# Patient Record
Sex: MALE | Race: WHITE | Employment: OTHER | URBAN - METROPOLITAN AREA
[De-identification: names, ages, dates, MRNs, and addresses within clinical notes are randomized per-mention and may not be internally consistent; named-entity substitution may affect disease eponyms.]

---

## 2019-03-12 ENCOUNTER — APPOINTMENT (OUTPATIENT)
Dept: CT IMAGING | Age: 24
End: 2019-03-12
Payer: COMMERCIAL

## 2019-03-12 ENCOUNTER — HOSPITAL ENCOUNTER (EMERGENCY)
Age: 24
Discharge: HOME | End: 2019-03-12
Attending: EMERGENCY MEDICINE
Payer: COMMERCIAL

## 2019-03-12 VITALS
DIASTOLIC BLOOD PRESSURE: 86 MMHG | RESPIRATION RATE: 22 BRPM | SYSTOLIC BLOOD PRESSURE: 134 MMHG | OXYGEN SATURATION: 100 % | HEART RATE: 96 BPM | TEMPERATURE: 98.7 F

## 2019-03-12 DIAGNOSIS — S09.90XA INJURY OF HEAD, INITIAL ENCOUNTER: Primary | ICD-10-CM

## 2019-03-12 DIAGNOSIS — S01.112A LACERATION OF LEFT EYEBROW, INITIAL ENCOUNTER: ICD-10-CM

## 2019-03-12 PROCEDURE — 72125 CT NECK SPINE W/O DYE: CPT

## 2019-03-12 PROCEDURE — 70450 CT HEAD/BRAIN W/O DYE: CPT

## 2019-03-12 PROCEDURE — 99283 EMERGENCY DEPT VISIT LOW MDM: CPT

## 2019-03-12 PROCEDURE — 4500000023 HC ED LEVEL 3 PROCEDURE

## 2019-03-12 PROCEDURE — 6370000000 HC RX 637 (ALT 250 FOR IP): Performed by: PHYSICIAN ASSISTANT

## 2019-03-12 RX ORDER — FLUTICASONE PROPIONATE 50 MCG
1 SPRAY, SUSPENSION (ML) NASAL DAILY
COMMUNITY

## 2019-03-12 RX ORDER — ACETAMINOPHEN 500 MG
1000 TABLET ORAL ONCE
Status: COMPLETED | OUTPATIENT
Start: 2019-03-12 | End: 2019-03-12

## 2019-03-12 RX ORDER — LORATADINE 10 MG/1
10 TABLET ORAL DAILY
Status: ON HOLD | COMMUNITY
End: 2020-07-11

## 2019-03-12 RX ORDER — RISPERIDONE 0.5 MG/1
0.5 TABLET, FILM COATED ORAL NIGHTLY
COMMUNITY
End: 2019-05-01

## 2019-03-12 RX ORDER — ACETAMINOPHEN 500 MG
1000 TABLET ORAL 3 TIMES DAILY
Qty: 90 TABLET | Refills: 0 | Status: SHIPPED | OUTPATIENT
Start: 2019-03-12 | End: 2019-05-01

## 2019-03-12 RX ORDER — ACETAMINOPHEN 325 MG/1
650 TABLET ORAL EVERY 6 HOURS PRN
COMMUNITY

## 2019-03-12 RX ORDER — TRAZODONE HYDROCHLORIDE 100 MG/1
100 TABLET ORAL NIGHTLY
COMMUNITY

## 2019-03-12 RX ADMIN — ACETAMINOPHEN 1000 MG: 500 TABLET ORAL at 13:17

## 2019-03-12 NOTE — ED PROVIDER NOTES
file     Attends [employer] service: Not on file     Active member of club or organization: Not on file     Attends meetings of clubs or organizations: Not on file     Relationship status: Not on file    Intimate partner violence:     Fear of current or ex partner: Not on file     Emotionally abused: Not on file     Physically abused: Not on file     Forced sexual activity: Not on file   Other Topics Concern    Not on file   Social History Narrative    Not on file       SCREENINGS             PHYSICAL EXAM    (up to 7 for level 4, 8 or more for level 5)     ED Triage Vitals [03/12/19 1105]   BP Temp Temp Source Pulse Resp SpO2 Height Weight   121/75 98.7 °F (37.1 °C) Axillary 94 16 100 % -- --       Physical Exam   Constitutional: He is oriented to person, place, and time. He appears well-developed and well-nourished. HENT:   Head: Normocephalic and atraumatic. Nose: Nose normal.   Eyes: Right eye exhibits no discharge. Left eye exhibits no discharge. Neck: Normal range of motion. Neck supple. Cardiovascular: Normal rate, regular rhythm and normal heart sounds. Exam reveals no gallop and no friction rub. No murmur heard. Pulmonary/Chest: Effort normal and breath sounds normal. No respiratory distress. He has no wheezes. He has no rales. Musculoskeletal: Normal range of motion. Neurological: He is alert and oriented to person, place, and time. He displays normal reflexes. No cranial nerve deficit. He exhibits normal muscle tone. Coordination normal.   Skin: Skin is warm and dry. Laceration (3cm laceration present on left eyebrow/forehead. Well approximated without foreign body. Hemostasis achieved. ) noted. No rash noted. He is not diaphoretic. Psychiatric: He has a normal mood and affect. His behavior is normal.   Nursing note and vitals reviewed.       DIAGNOSTIC RESULTS   LABS:    Labs Reviewed - No data to display    All other labs were within normal range or not returned as of this and mastoid air cells demonstrate no acute abnormality. SOFT TISSUES/SKULL:  No acute abnormality of the visualized skull or soft tissues. No acute intracranial abnormality. Ct Cervical Spine Wo Contrast    Result Date: 3/12/2019  EXAMINATION: CT OF THE CERVICAL SPINE WITHOUT CONTRAST 3/12/2019 11:20 am TECHNIQUE: CT of the cervical spine was performed without the administration of intravenous contrast. Multiplanar reformatted images are provided for review. Dose modulation, iterative reconstruction, and/or weight based adjustment of the mA/kV was utilized to reduce the radiation dose to as low as reasonably achievable. COMPARISON: None. HISTORY: ORDERING SYSTEM PROVIDED HISTORY: head injury TECHNOLOGIST PROVIDED HISTORY: If patient is on cardiac monitor and/or pulse ox, they may be taken off cardiac monitor and pulse ox, left on O2 if currently on. All monitors reattached when patient returns to room. Ordering Physician Provided Reason for Exam: was hitting head against floor repeadetly/ laceration to left upper eyebrow Acuity: Acute Type of Exam: Initial FINDINGS: BONES/ALIGNMENT: There is no evidence of an acute cervical spine fracture. There is normal alignment of the cervical spine. DEGENERATIVE CHANGES: No significant degenerative changes. SOFT TISSUES: There is no prevertebral soft tissue swelling. No acute abnormality of the cervical spine.          PROCEDURES   Unless otherwise noted below, none     Lac Repair  Date/Time: 3/17/2019 9:37 PM  Performed by: Marita Shetty PA-C  Authorized by: Zafar Diaz MD     Consent:     Consent obtained:  Verbal    Consent given by:  Parent    Risks discussed:  Infection, need for additional repair, nerve damage, pain, poor cosmetic result, poor wound healing, retained foreign body, tendon damage and vascular damage    Alternatives discussed:  No treatment, delayed treatment, observation and referral  Anesthesia (see MAR for exact dosages): Anesthesia method:  None  Laceration details:     Location:  Face    Face location:  L eyebrow    Length (cm):  3    Depth (mm):  7  Repair type:     Repair type:  Simple  Pre-procedure details:     Preparation:  Patient was prepped and draped in usual sterile fashion and imaging obtained to evaluate for foreign bodies  Exploration:     Hemostasis achieved with:  Direct pressure    Wound exploration: wound explored through full range of motion and entire depth of wound probed and visualized      Wound extent: areolar tissue violated      Wound extent: no fascia violation noted, no foreign bodies/material noted, no muscle damage noted, no nerve damage noted, no tendon damage noted, no underlying fracture noted and no vascular damage noted      Contaminated: no    Treatment:     Area cleansed with:  Hibiclens and saline    Amount of cleaning:  Standard    Irrigation solution:  Sterile saline    Irrigation method:  Syringe    Visualized foreign bodies/material removed: no    Skin repair:     Repair method:  Tissue adhesive  Approximation:     Approximation:  Close    Vermilion border: well-aligned    Post-procedure details:     Dressing:  Open (no dressing)    Patient tolerance of procedure: Tolerated well, no immediate complications        CRITICAL CARE TIME   N/A    CONSULTS:  None      EMERGENCY DEPARTMENT COURSE and DIFFERENTIALDIAGNOSIS/MDM:   Vitals:    Vitals:    03/12/19 1105 03/12/19 1402   BP: 121/75 134/86   Pulse: 94 96   Resp: 16 22   Temp: 98.7 °F (37.1 °C)    TempSrc: Axillary    SpO2: 100% 100%       Patient was given thefollowing medications:  Medications   acetaminophen (TYLENOL) tablet 1,000 mg (1,000 mg Oral Given 3/12/19 1317)       Patient presenting with head trauma. Patient's neurological exam was non-focal and unremarkable. [place] Head CT Rule was applied and patient did not fall into the low risk category so a head CT was obtained. This showed no significant findings.   At this time, it is felt that the most likely explanation for the patient's symptoms is head injury versus concussion. I also considered SAH, SDH, Epidural Hematoma, IPH, skull fracture, migraine but this appears less likely considering the data gathered thus far. Wound to left eyebrow was closed with tissue adhesive (See note above). Patient tolerated well. Patient provided tylenol. Discussed warning signs that would prompt return. Head trauma handout was provided. Discussed in detail head injury/concussion management. Return or present to emergency department urgently if new or worsening symptoms develop. Impression:   Head Injury  Laceration left eyebrow    Plan   Tylenol for pain control. Advised patient on supportive measures for cognitive rest - avoid strenuous use of cognitive function for at least 24 hours. This means no tv, books, texting, computers, etc. Limit visitors to the house. Instructed patient/family to monitor for neurologic symptoms, severe HA, change in mental status, seizures, loss of consciousness. Instructed patient to follow up with primary provider in 2-5 days or present to emergency department should symptoms worsen or not improve. Patient verbally expressed understanding and all questions were addressed to patient's satisfaction. FINAL IMPRESSION      1. Injury of head, initial encounter    2. Laceration of left eyebrow, initial encounter          DISPOSITION/PLAN   DISPOSITION Decision To Discharge 03/12/2019 01:49:34 PM      PATIENT REFERREDTO:  [address] [facility]  [address] [place] [address]  [phone]  Go to   As needed, If symptoms worsen      DISCHARGE MEDICATIONS:  Discharge Medication List as of 3/12/2019  1:50 PM      START taking these medications    Details   !! acetaminophen (TYLENOL) 500 MG tablet Take 2 tablets by mouth 3 times daily, Disp-90 tablet, R-0Print       !! - Potential duplicate medications found. Please discuss with provider. DISCONTINUED MEDICATIONS:  Discharge Medication List as of 3/12/2019  1:50 PM                 (Please note that portions ofthis note were completed with a voice recognition program.  Efforts were made to edit the dictations but occasionally words are mis-transcribed.)    Kimi Valdovinos PA-C (electronically signed)            Sonya Duran PA-C  03/17/19 [id number]

## 2019-03-16 NOTE — ED PROVIDER NOTES
I independently performed a history and physical on [facility]. All diagnostic, treatment, and disposition decisions were made by myself in conjunction with the advanced practice provider. For further details of [facility] emergency department encounter, please see the advance practice provider's documentation. In brief, this is a 26y.o. year old male, with   Chief Complaint   Patient presents with    Head Injury     slammed head against floor 20times (from group home) no LOC, (currently has unsteady gait, bilateral ear infection, (nonverbal)     Patient has a history of developmental delay. He has a history of hitting his head on things. Today he struck his head against the floor multiple times. On exam, I find a male patient with several superficial abrasions/lacerations to his face. No bleeding. He is awake, alert, looking around the room and occasionally picking at his face. Imaging was obtained and does not show acute abnormality. He had his wounds addressed. Patient discharged back to his group home.      Susy Call MD  03/15/19 5630

## 2019-05-01 ENCOUNTER — APPOINTMENT (OUTPATIENT)
Dept: CT IMAGING | Age: 24
End: 2019-05-01
Payer: COMMERCIAL

## 2019-05-01 ENCOUNTER — HOSPITAL ENCOUNTER (EMERGENCY)
Age: 24
Discharge: HOME | End: 2019-05-01
Attending: EMERGENCY MEDICINE
Payer: COMMERCIAL

## 2019-05-01 VITALS
OXYGEN SATURATION: 100 % | HEIGHT: 70 IN | DIASTOLIC BLOOD PRESSURE: 66 MMHG | SYSTOLIC BLOOD PRESSURE: 129 MMHG | BODY MASS INDEX: 25.77 KG/M2 | HEART RATE: 97 BPM | RESPIRATION RATE: 18 BRPM | TEMPERATURE: 98 F | WEIGHT: 180 LBS

## 2019-05-01 DIAGNOSIS — S09.90XA CLOSED HEAD INJURY, INITIAL ENCOUNTER: Primary | ICD-10-CM

## 2019-05-01 PROCEDURE — 70450 CT HEAD/BRAIN W/O DYE: CPT

## 2019-05-01 PROCEDURE — 99284 EMERGENCY DEPT VISIT MOD MDM: CPT

## 2019-05-01 RX ORDER — MAGNESIUM HYDROXIDE/ALUMINUM HYDROXICE/SIMETHICONE 120; 1200; 1200 MG/30ML; MG/30ML; MG/30ML
5 SUSPENSION ORAL EVERY 6 HOURS PRN
COMMUNITY

## 2019-05-01 RX ORDER — LOPERAMIDE HYDROCHLORIDE 2 MG/1
2 TABLET ORAL 4 TIMES DAILY PRN
COMMUNITY

## 2019-05-01 RX ORDER — CALCIUM CARB/VITAMIN D3/VIT K1 500-100-40
TABLET,CHEWABLE ORAL 2 TIMES DAILY
COMMUNITY

## 2019-05-01 RX ORDER — DIAPER,BRIEF,INFANT-TODD,DISP
EACH MISCELLANEOUS 2 TIMES DAILY
COMMUNITY
End: 2019-11-14 | Stop reason: ALTCHOICE

## 2019-05-01 RX ORDER — GUAIFENESIN AND DEXTROMETHORPHAN HYDROBROMIDE 100; 10 MG/5ML; MG/5ML
5 SOLUTION ORAL EVERY 4 HOURS PRN
COMMUNITY

## 2019-05-01 RX ORDER — PSEUDOEPHEDRINE HCL 120 MG/1
120 TABLET, FILM COATED, EXTENDED RELEASE ORAL EVERY 12 HOURS
COMMUNITY

## 2019-05-01 SDOH — HEALTH STABILITY: MENTAL HEALTH: HOW OFTEN DO YOU HAVE A DRINK CONTAINING ALCOHOL?: NEVER

## 2019-05-16 NOTE — ED PROVIDER NOTES
Emergency Physician Note    Chief Complaint  Head Injury (pt arrives to room 3 via ems c/o head inj, ems states pt is autistic and hits his head on things and today hit his head on chair, caregiver states pt usually ambulates on own but since hittin ghead he is a two assist, pt with abrasions noted to forehead, caregiver denies any loc)       History of Present Illness  April Turner is a 26 y.o. male who presents to the ED for Fall. Patient has a history of autism. Patient experience to file without his helmet earlier today around 2 - 3 PM. Patient fell from standing height. Patient did hit his head on a chair. No reported a loss of consciousness. However, since then, patient has been requiring increased assistance with ambulation. No new focal deficit. No areas of pain elicited. Patient has been more drowsy since hitting his head. No new medications. Patient is otherwise well prior to onset of current symptoms    10 systems reviewed, pertinent positives per HPI otherwise noted to be negative    I have reviewed the following from the nursing documentation:      Prior to Admission medications    Medication Sig Start Date End Date Taking? Authorizing Provider   hepatitis B vaccine (ENGERIX-B) 20 MCG/ML injection Inject 20 mcg into the muscle once   Yes Historical Provider, MD   loperamide (ANTI-DIARRHEAL) 2 MG tablet Take 2 mg by mouth 4 times daily as needed for Diarrhea   Yes Historical Provider, MD   Benzocaine-Menthol (CHLORASEPTIC) 6-10 MG LOZG lozenge Take 1 lozenge by mouth every 2 hours as needed for Sore Throat   Yes Historical Provider, MD   Dextromethorphan-guaiFENesin  MG/5ML SYRP Take 5 mLs by mouth every 4 hours as needed for Cough   Yes Historical Provider, MD   hydrocortisone 1 % cream Apply topically 2 times daily Apply topically 2 times daily.    Yes Historical Provider, MD   aluminum & magnesium hydroxide-simethicone (MAALOX) 200-200-20 MG/5ML SUSP suspension Take 5 mLs by mouth every 6 hours as needed for Indigestion   Yes Historical Provider, MD   magnesium hydroxide (MILK OF MAGNESIA CONCENTRATE) 2400 MG/10ML SUSP Take 2,400 mg by mouth once as needed   Yes Historical Provider, MD   pseudoephedrine (SUDAFED 12 HR) 120 MG extended release tablet Take 120 mg by mouth every 12 hours   Yes Historical Provider, MD   tolnaftate (TINACTIN) 1 % spray Apply topically 2 times daily Apply topically 2 times daily. Yes Historical Provider, MD   traZODone (DESYREL) 100 MG tablet Take 200 mg by mouth nightly   Yes Historical Provider, MD   fluticasone (FLONASE) 50 MCG/ACT nasal spray 1 spray by Each Nare route daily   Yes Historical Provider, MD   loratadine (CLARITIN) 10 MG tablet Take 10 mg by mouth daily   Yes Historical Provider, MD   acetaminophen (TYLENOL) 325 MG tablet Take 650 mg by mouth every 6 hours as needed for Pain   Yes Historical Provider, MD   Camphor-Eucalyptus-Menthol (VICKS VAPORUB EX) Apply topically 3 times daily as needed   Yes Historical Provider, MD       Allergies as of 05/01/2019 - Review Complete 05/01/2019   Allergen Reaction Noted    Ziprasidone hcl  05/01/2019       Past Medical History:   Diagnosis Date    Ataxia     Autism     Encephalopathy         Surgical History: History reviewed. No pertinent surgical history. Family History:  History reviewed. No pertinent family history.     Social History     Socioeconomic History    Marital status: Single     Spouse name: Not on file    Number of children: Not on file    Years of education: Not on file    Highest education level: Not on file   Occupational History    Not on file   Social Needs    Financial resource strain: Not on file    Food insecurity:     Worry: Not on file     Inability: Not on file    Transportation needs:     Medical: Not on file     Non-medical: Not on file   Tobacco Use    Smoking status: Never Smoker    Smokeless tobacco: Never Used   Substance and Sexual Activity    Alcohol use: Never Frequency: Never    Drug use: Never    Sexual activity: Never   Lifestyle    Physical activity:     Days per week: Not on file     Minutes per session: Not on file    Stress: Not on file   Relationships    Social connections:     Talks on phone: Not on file     Gets together: Not on file     Attends Congregational service: Not on file     Active member of club or organization: Not on file     Attends meetings of clubs or organizations: Not on file     Relationship status: Not on file    Intimate partner violence:     Fear of current or ex partner: Not on file     Emotionally abused: Not on file     Physically abused: Not on file     Forced sexual activity: Not on file   Other Topics Concern    Not on file   Social History Narrative    Not on file       Nursing notes reviewed. ED Triage Vitals [05/01/19 1807]   Enc Vitals Group      /66      Pulse 97      Resp 18      Temp 98 °F (36.7 °C)      Temp Source Oral      SpO2 100 %      Weight 180 lb (81.6 kg)      Height 5' 10\" (1.778 m)      Head Circumference       Peak Flow       Pain Score       Pain Loc       Pain Edu? Excl. in [address]? GENERAL:  Awake, alert. Well developed, well nourished with no apparent distress. HENT:  Normocephalic, Atraumatic, moist mucous membranes. EYES:  Pupils equal round and reactive to light, Conjunctiva normal, extraocular movements normal.  NECK:  No meningeal signs, Supple. CHEST:  Regular rate and rhythm, chest wall non-tender. LUNGS:  Clear to auscultation bilaterally, no respiratory distress. ABDOMEN:  Soft, non-tender, non-distended, normal bowel sounds. No costovertebral angle tenderness to palpation. EXTREMITIES:  Normal range of motion, no edema, no tenderness, no deformity, distal pulses present. BACK:  No tenderness. SKIN: Warm, dry and intact. NEUROLOGIC: Normal mental status. Moving all extremities to command. RADIOLOGY  X-RAYS:  I have reviewed radiologic plain film image(s).   ALL

## 2019-09-09 ENCOUNTER — APPOINTMENT (OUTPATIENT)
Dept: CT IMAGING | Age: 24
End: 2019-09-09
Payer: MEDICARE

## 2019-09-09 ENCOUNTER — HOSPITAL ENCOUNTER (EMERGENCY)
Age: 24
Discharge: HOME | End: 2019-09-10
Attending: EMERGENCY MEDICINE
Payer: MEDICARE

## 2019-09-09 VITALS
SYSTOLIC BLOOD PRESSURE: 146 MMHG | HEIGHT: 68 IN | RESPIRATION RATE: 16 BRPM | HEART RATE: 100 BPM | WEIGHT: 180 LBS | DIASTOLIC BLOOD PRESSURE: 88 MMHG | BODY MASS INDEX: 27.28 KG/M2 | OXYGEN SATURATION: 100 %

## 2019-09-09 DIAGNOSIS — S02.5XXB OPEN BROKEN TOOTH WITH COMPLICATION, INITIAL ENCOUNTER: ICD-10-CM

## 2019-09-09 DIAGNOSIS — Z87.820 HX OF TRAUMATIC BRAIN INJURY: ICD-10-CM

## 2019-09-09 DIAGNOSIS — R03.0 ELEVATED BLOOD PRESSURE READING: ICD-10-CM

## 2019-09-09 DIAGNOSIS — S09.90XA INJURY OF HEAD, INITIAL ENCOUNTER: ICD-10-CM

## 2019-09-09 DIAGNOSIS — S02.40CB OPEN FRACTURE OF RIGHT SIDE OF MAXILLA, INITIAL ENCOUNTER (HCC): ICD-10-CM
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
## 2019-09-09 PROCEDURE — 70450 CT HEAD/BRAIN W/O DYE: CPT

## 2019-09-09 PROCEDURE — 72125 CT NECK SPINE W/O DYE: CPT

## 2019-09-09 PROCEDURE — 70486 CT MAXILLOFACIAL W/O DYE: CPT

## 2019-09-09 PROCEDURE — 6370000000 HC RX 637 (ALT 250 FOR IP): Performed by: EMERGENCY MEDICINE

## 2019-09-09 PROCEDURE — 99284 EMERGENCY DEPT VISIT MOD MDM: CPT

## 2019-09-09 RX ORDER — RISPERIDONE 1 MG/1
1.5 TABLET, FILM COATED ORAL DAILY
COMMUNITY

## 2019-09-09 RX ORDER — ACETAMINOPHEN 500 MG
1000 TABLET ORAL ONCE
Status: COMPLETED | OUTPATIENT
Start: 2019-09-09 | End: 2019-09-09

## 2019-09-09 RX ADMIN — ACETAMINOPHEN 1000 MG: 500 TABLET ORAL at 23:15

## 2019-09-09 ASSESSMENT — PAIN DESCRIPTION - PAIN TYPE: TYPE: ACUTE PAIN

## 2019-09-09 ASSESSMENT — PAIN DESCRIPTION - FREQUENCY: FREQUENCY: CONTINUOUS

## 2019-09-10 PROCEDURE — 6370000000 HC RX 637 (ALT 250 FOR IP): Performed by: EMERGENCY MEDICINE

## 2019-09-10 RX ORDER — CLINDAMYCIN HYDROCHLORIDE 150 MG/1
300 CAPSULE ORAL ONCE
Status: COMPLETED | OUTPATIENT
Start: 2019-09-10 | End: 2019-09-10

## 2019-09-10 RX ORDER — CLINDAMYCIN HYDROCHLORIDE 300 MG/1
300 CAPSULE ORAL 4 TIMES DAILY
Qty: 28 CAPSULE | Refills: 0 | Status: SHIPPED | OUTPATIENT
Start: 2019-09-10 | End: 2019-09-17

## 2019-09-10 RX ADMIN — CLINDAMYCIN HYDROCHLORIDE 300 MG: 150 CAPSULE ORAL at 00:57

## 2019-09-10 NOTE — ED PROVIDER NOTES
CHIEF COMPLAINT  Fall (FALL WITH MOUTH INJURY AND FELL A SECOND TIME WITHOUT HELMET ON.)      HISTORY OF PRESENT ILLNESS  Gaston Hayes is a 26 y.o. male presents to the ED with fall, this afternoon, had a mouth injury, and his helmet off briefly and fell again, at shift change the new caregiver decided he needed to come in to be evaluated, history of encephalopathy, presumably anoxic brain injury, and is now nonverbal, grunts/moans, and needs help with his activities of daily living, lives in a group home, he is a high fall risk, and has had multiple in the past, he is unable to complain of any pain,. Has not vomited but caregiver, and has tolerated p.o. though it appears that his tooth is broken    I have reviewed the following from the nursing documentation. Past Medical History:   Diagnosis Date    Ataxia     Autism     Encephalopathy      History reviewed. No pertinent surgical history. History reviewed. No pertinent family history.   Social History     Socioeconomic History    Marital status: Single     Spouse name: Not on file    Number of children: Not on file    Years of education: Not on file    Highest education level: Not on file   Occupational History    Not on file   Social Needs    Financial resource strain: Not on file    Food insecurity:     Worry: Not on file     Inability: Not on file    Transportation needs:     Medical: Not on file     Non-medical: Not on file   Tobacco Use    Smoking status: Never Smoker    Smokeless tobacco: Never Used   Substance and Sexual Activity    Alcohol use: Never     Frequency: Never    Drug use: Never    Sexual activity: Never   Lifestyle    Physical activity:     Days per week: Not on file     Minutes per session: Not on file    Stress: Not on file   Relationships    Social connections:     Talks on phone: Not on file     Gets together: Not on file     Attends [employer] service: Not on file     Active member of club or organization: Not on contrast. Dose modulation, iterative reconstruction, and/or weight based adjustment of the mA/kV was utilized to reduce the radiation dose to as low as reasonably achievable. COMPARISON: 05/01/2019 HISTORY: ORDERING SYSTEM PROVIDED HISTORY: head injury, fall, traumatic brain injury history, dental injury, nasal injury TECHNOLOGIST PROVIDED HISTORY: Reason for Exam: fell Acuity: Acute Type of Exam: Initial FINDINGS: FACIAL BONES:  There is an acute fracture of the maxilla involving the roots of the central and lateral incisors and the cuspid on the right though the lateral incisor is absent. The pterygoid plates and zygomatic arches are intact. The mandible is intact. The mandibular condyles are normally situated. The nasal bones and maxillary nasal processes are intact. ORBITS:  The globes appear intact. The extraocular muscles, optic nerve sheath complexes and lacrimal glands appear unremarkable. No retrobulbar hematoma or mass is seen. The orbital walls and rims are intact. SINUSES/MASTOIDS:  The paranasal sinuses and mastoid air cells are well aerated. No acute fracture is seen. SOFT TISSUES:  There is soft tissue swelling anterior to the maxilla. BRAIN: There is no acute intracranial hemorrhage. No area of abnormal brain attenuation is identified. Cavum septum pellucidum is again seen. There is no hydrocephalus. There is no calvarial fracture. 1. Acute nondisplaced fracture of the anterior maxilla on the right as detailed above. 2. No other fracture. 3. No acute intracranial abnormality. Ct Cervical Spine Wo Contrast    Result Date: 9/10/2019  EXAMINATION: CT OF THE CERVICAL SPINE WITHOUT CONTRAST 9/9/2019 11:08 pm TECHNIQUE: CT of the cervical spine was performed without the administration of intravenous contrast. Multiplanar reformatted images are provided for review.  Dose modulation, iterative reconstruction, and/or weight based adjustment of the mA/kV was utilized to reduce the radiation dose to as low as reasonably achievable. COMPARISON: 03/12/2019 CT HISTORY: ORDERING SYSTEM PROVIDED HISTORY: C-SPINE TRAUMA, NEXUS/CCR POSITIVE TECHNOLOGIST PROVIDED HISTORY: Reason for Exam: fell Acuity: Acute Type of Exam: Initial FINDINGS: BONES/ALIGNMENT: There is no evidence of an acute cervical spine fracture. There is normal alignment of the cervical spine. DEGENERATIVE CHANGES: No significant degenerative changes. SOFT TISSUES: There is no prevertebral soft tissue swelling. No acute abnormality of the cervical spine. Ct Maxillofacial Wo Contrast    Result Date: 9/9/2019  EXAMINATION: CT OF THE FACE WITHOUT CONTRAST; CT OF THE HEAD WITHOUT CONTRAST  9/9/2019 11:08 pm TECHNIQUE: CT of the face was performed without the administration of intravenous contrast. Multiplanar reformatted images are provided for review. Dose modulation, iterative reconstruction, and/or weight based adjustment of the mA/kV was utilized to reduce the radiation dose to as low as reasonably achievable.; CT of the head was performed without the administration of intravenous contrast. Dose modulation, iterative reconstruction, and/or weight based adjustment of the mA/kV was utilized to reduce the radiation dose to as low as reasonably achievable. COMPARISON: 05/01/2019 HISTORY: ORDERING SYSTEM PROVIDED HISTORY: head injury, fall, traumatic brain injury history, dental injury, nasal injury TECHNOLOGIST PROVIDED HISTORY: Reason for Exam: fell Acuity: Acute Type of Exam: Initial FINDINGS: FACIAL BONES:  There is an acute fracture of the maxilla involving the roots of the central and lateral incisors and the cuspid on the right though the lateral incisor is absent. The pterygoid plates and zygomatic arches are intact. The mandible is intact. The mandibular condyles are normally situated. The nasal bones and maxillary nasal processes are intact. ORBITS:  The globes appear intact.   The extraocular muscles, optic nerve sheath

## 2019-10-04 ENCOUNTER — HOSPITAL ENCOUNTER (EMERGENCY)
Age: 24
Discharge: HOME | End: 2019-10-04
Attending: EMERGENCY MEDICINE
Payer: COMMERCIAL

## 2019-10-04 ENCOUNTER — APPOINTMENT (OUTPATIENT)
Dept: CT IMAGING | Age: 24
End: 2019-10-04
Payer: COMMERCIAL

## 2019-10-04 VITALS
TEMPERATURE: 98.8 F | HEART RATE: 73 BPM | SYSTOLIC BLOOD PRESSURE: 104 MMHG | RESPIRATION RATE: 18 BRPM | BODY MASS INDEX: 27.28 KG/M2 | HEIGHT: 68 IN | WEIGHT: 180 LBS | OXYGEN SATURATION: 98 % | DIASTOLIC BLOOD PRESSURE: 57 MMHG
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
## 2019-10-04 PROCEDURE — 70450 CT HEAD/BRAIN W/O DYE: CPT

## 2019-10-04 PROCEDURE — 99284 EMERGENCY DEPT VISIT MOD MDM: CPT

## 2019-10-04 PROCEDURE — 72125 CT NECK SPINE W/O DYE: CPT

## 2019-11-05 ENCOUNTER — APPOINTMENT (OUTPATIENT)
Dept: CT IMAGING | Age: 24
End: 2019-11-05
Payer: COMMERCIAL

## 2019-11-05 ENCOUNTER — HOSPITAL ENCOUNTER (EMERGENCY)
Age: 24
Discharge: HOME | End: 2019-11-05
Payer: COMMERCIAL

## 2019-11-05 VITALS
DIASTOLIC BLOOD PRESSURE: 65 MMHG | TEMPERATURE: 98.7 F | SYSTOLIC BLOOD PRESSURE: 108 MMHG | RESPIRATION RATE: 12 BRPM | HEART RATE: 88 BPM | OXYGEN SATURATION: 98 %

## 2019-11-05 DIAGNOSIS — S01.81XA CHIN LACERATION, INITIAL ENCOUNTER: Primary | ICD-10-CM

## 2019-11-05 PROCEDURE — 70486 CT MAXILLOFACIAL W/O DYE: CPT

## 2019-11-05 PROCEDURE — 99283 EMERGENCY DEPT VISIT LOW MDM: CPT

## 2019-11-05 PROCEDURE — 72125 CT NECK SPINE W/O DYE: CPT

## 2019-11-05 PROCEDURE — 70450 CT HEAD/BRAIN W/O DYE: CPT

## 2019-11-05 PROCEDURE — 6370000000 HC RX 637 (ALT 250 FOR IP): Performed by: PHYSICIAN ASSISTANT

## 2019-11-05 PROCEDURE — 4500000023 HC ED LEVEL 3 PROCEDURE

## 2019-11-05 RX ADMIN — Medication 3 ML: at 13:22

## 2019-11-11 ENCOUNTER — APPOINTMENT (OUTPATIENT)
Dept: CT IMAGING | Age: 24
End: 2019-11-11
Payer: COMMERCIAL

## 2019-11-11 ENCOUNTER — HOSPITAL ENCOUNTER (EMERGENCY)
Age: 24
Discharge: HOME | End: 2019-11-11
Payer: COMMERCIAL

## 2019-11-11 ENCOUNTER — APPOINTMENT (OUTPATIENT)
Dept: GENERAL RADIOLOGY | Age: 24
End: 2019-11-11
Payer: COMMERCIAL

## 2019-11-11 VITALS
SYSTOLIC BLOOD PRESSURE: 106 MMHG | DIASTOLIC BLOOD PRESSURE: 68 MMHG | WEIGHT: 175 LBS | TEMPERATURE: 97.6 F | BODY MASS INDEX: 29.16 KG/M2 | HEIGHT: 65 IN | RESPIRATION RATE: 16 BRPM | OXYGEN SATURATION: 100 % | HEART RATE: 84 BPM

## 2019-11-11 DIAGNOSIS — S50.02XA CONTUSION OF LEFT ELBOW, INITIAL ENCOUNTER: ICD-10-CM

## 2019-11-11 DIAGNOSIS — S00.03XA HEMATOMA OF LEFT PARIETAL SCALP, INITIAL ENCOUNTER: Primary | ICD-10-CM

## 2019-11-11 PROCEDURE — 72125 CT NECK SPINE W/O DYE: CPT

## 2019-11-11 PROCEDURE — 99283 EMERGENCY DEPT VISIT LOW MDM: CPT

## 2019-11-11 PROCEDURE — 73070 X-RAY EXAM OF ELBOW: CPT

## 2019-11-11 PROCEDURE — 70450 CT HEAD/BRAIN W/O DYE: CPT

## 2019-11-14 ENCOUNTER — APPOINTMENT (OUTPATIENT)
Dept: GENERAL RADIOLOGY | Age: 24
End: 2019-11-14
Payer: COMMERCIAL

## 2019-11-14 ENCOUNTER — HOSPITAL ENCOUNTER (EMERGENCY)
Age: 24
Discharge: HOME | End: 2019-11-14
Payer: COMMERCIAL

## 2019-11-14 VITALS
RESPIRATION RATE: 16 BRPM | WEIGHT: 175 LBS | OXYGEN SATURATION: 99 % | HEART RATE: 98 BPM | SYSTOLIC BLOOD PRESSURE: 119 MMHG | BODY MASS INDEX: 29.16 KG/M2 | HEIGHT: 65 IN | DIASTOLIC BLOOD PRESSURE: 63 MMHG | TEMPERATURE: 98.7 F

## 2019-11-14 DIAGNOSIS — J06.9 UPPER RESPIRATORY TRACT INFECTION, UNSPECIFIED TYPE: Primary | ICD-10-CM

## 2019-11-14 LAB
A/G RATIO: 1.3 (ref 1.1–2.2)
ALBUMIN SERPL-MCNC: 4.2 G/DL (ref 3.4–5)
ALP BLD-CCNC: 119 U/L (ref 40–129)
ALT SERPL-CCNC: <5 U/L (ref 10–40)
ANION GAP SERPL CALCULATED.3IONS-SCNC: 14 MMOL/L (ref 3–16)
AST SERPL-CCNC: 11 U/L (ref 15–37)
BILIRUB SERPL-MCNC: 0.9 MG/DL (ref 0–1)
BUN BLDV-MCNC: 7 MG/DL (ref 7–20)
CALCIUM SERPL-MCNC: 9.5 MG/DL (ref 8.3–10.6)
CHLORIDE BLD-SCNC: 101 MMOL/L (ref 99–110)
CO2: 23 MMOL/L (ref 21–32)
CREAT SERPL-MCNC: 0.6 MG/DL (ref 0.9–1.3)
GFR AFRICAN AMERICAN: >60
GFR NON-AFRICAN AMERICAN: >60
GLOBULIN: 3.2 G/DL
GLUCOSE BLD-MCNC: 91 MG/DL (ref 70–99)
HCT VFR BLD CALC: 41.2 % (ref 40.5–52.5)
HEMOGLOBIN: 13.7 G/DL (ref 13.5–17.5)
MCH RBC QN AUTO: 29.1 PG (ref 26–34)
MCHC RBC AUTO-ENTMCNC: 33.2 G/DL (ref 31–36)
MCV RBC AUTO: 87.6 FL (ref 80–100)
PDW BLD-RTO: 13.5 % (ref 12.4–15.4)
PLATELET # BLD: 198 K/UL (ref 135–450)
PMV BLD AUTO: 9.4 FL (ref 5–10.5)
POTASSIUM SERPL-SCNC: 3.9 MMOL/L (ref 3.5–5.1)
RAPID INFLUENZA  B AGN: NEGATIVE
RAPID INFLUENZA A AGN: NEGATIVE
RBC # BLD: 4.7 M/UL (ref 4.2–5.9)
SODIUM BLD-SCNC: 138 MMOL/L (ref 136–145)
TOTAL PROTEIN: 7.4 G/DL (ref 6.4–8.2)
WBC # BLD: 8.3 K/UL (ref 4–11)

## 2019-11-14 PROCEDURE — 85027 COMPLETE CBC AUTOMATED: CPT

## 2019-11-14 PROCEDURE — 6370000000 HC RX 637 (ALT 250 FOR IP): Performed by: NURSE PRACTITIONER

## 2019-11-14 PROCEDURE — 80053 COMPREHEN METABOLIC PANEL: CPT

## 2019-11-14 PROCEDURE — 99284 EMERGENCY DEPT VISIT MOD MDM: CPT

## 2019-11-14 PROCEDURE — 71045 X-RAY EXAM CHEST 1 VIEW: CPT

## 2019-11-14 PROCEDURE — 2580000003 HC RX 258: Performed by: NURSE PRACTITIONER

## 2019-11-14 PROCEDURE — 94640 AIRWAY INHALATION TREATMENT: CPT

## 2019-11-14 PROCEDURE — 87804 INFLUENZA ASSAY W/OPTIC: CPT

## 2019-11-14 RX ORDER — PREDNISONE 10 MG/1
40 TABLET ORAL DAILY
Qty: 16 TABLET | Refills: 0 | Status: SHIPPED | OUTPATIENT
Start: 2019-11-14 | End: 2019-11-18

## 2019-11-14 RX ORDER — PREDNISONE 20 MG/1
40 TABLET ORAL ONCE
Status: COMPLETED | OUTPATIENT
Start: 2019-11-14 | End: 2019-11-14

## 2019-11-14 RX ORDER — ALBUTEROL SULFATE 90 UG/1
AEROSOL, METERED RESPIRATORY (INHALATION)
Qty: 1 INHALER | Refills: 1 | Status: SHIPPED | OUTPATIENT
Start: 2019-11-14

## 2019-11-14 RX ORDER — IPRATROPIUM BROMIDE AND ALBUTEROL SULFATE 2.5; .5 MG/3ML; MG/3ML
1 SOLUTION RESPIRATORY (INHALATION) ONCE
Status: COMPLETED | OUTPATIENT
Start: 2019-11-14 | End: 2019-11-14

## 2019-11-14 RX ORDER — 0.9 % SODIUM CHLORIDE 0.9 %
1000 INTRAVENOUS SOLUTION INTRAVENOUS ONCE
Status: COMPLETED | OUTPATIENT
Start: 2019-11-14 | End: 2019-11-14

## 2019-11-14 RX ORDER — IPRATROPIUM BROMIDE AND ALBUTEROL SULFATE 2.5; .5 MG/3ML; MG/3ML
1 SOLUTION RESPIRATORY (INHALATION)
Status: DISCONTINUED | OUTPATIENT
Start: 2019-11-14 | End: 2019-11-14

## 2019-11-14 RX ADMIN — SODIUM CHLORIDE 1000 ML: 9 INJECTION, SOLUTION INTRAVENOUS at 15:36

## 2019-11-14 RX ADMIN — IPRATROPIUM BROMIDE AND ALBUTEROL SULFATE 1 AMPULE: .5; 3 SOLUTION RESPIRATORY (INHALATION) at 14:21

## 2019-11-14 RX ADMIN — PREDNISONE 40 MG: 20 TABLET ORAL at 15:05

## 2019-11-18 ENCOUNTER — HOSPITAL ENCOUNTER (OUTPATIENT)
Dept: GENERAL RADIOLOGY | Age: 24
Discharge: HOME | End: 2019-11-18
Payer: COMMERCIAL

## 2019-11-18 DIAGNOSIS — R13.10 DYSPHAGIA, UNSPECIFIED TYPE: ICD-10-CM

## 2019-11-18 PROCEDURE — 74230 X-RAY XM SWLNG FUNCJ C+: CPT

## 2019-11-25 ENCOUNTER — OFFICE VISIT (OUTPATIENT)
Dept: ENT CLINIC | Age: 24
End: 2019-11-25
Payer: COMMERCIAL

## 2019-11-25 VITALS
DIASTOLIC BLOOD PRESSURE: 70 MMHG | WEIGHT: 161 LBS | SYSTOLIC BLOOD PRESSURE: 101 MMHG | HEIGHT: 67 IN | BODY MASS INDEX: 25.27 KG/M2 | HEART RATE: 89 BPM

## 2019-11-25 DIAGNOSIS — F84.0 AUTISM: ICD-10-CM

## 2019-11-25 DIAGNOSIS — K13.79 ACQUIRED VELOPHARYNGEAL INSUFFICIENCY: ICD-10-CM

## 2019-11-25 DIAGNOSIS — R13.12 OROPHARYNGEAL DYSPHAGIA: Primary | ICD-10-CM

## 2019-11-25 DIAGNOSIS — H60.91 OTITIS EXTERNA OF RIGHT EAR, UNSPECIFIED CHRONICITY, UNSPECIFIED TYPE: ICD-10-CM

## 2019-11-25 PROCEDURE — 99204 OFFICE O/P NEW MOD 45 MIN: CPT | Performed by: OTOLARYNGOLOGY

## 2019-11-25 PROCEDURE — 92511 NASOPHARYNGOSCOPY: CPT | Performed by: OTOLARYNGOLOGY

## 2019-11-25 RX ORDER — CIPROFLOXACIN AND DEXAMETHASONE 3; 1 MG/ML; MG/ML
4 SUSPENSION/ DROPS AURICULAR (OTIC) 2 TIMES DAILY
Qty: 1 BOTTLE | Refills: 0 | Status: SHIPPED | OUTPATIENT
Start: 2019-11-25 | End: 2019-11-30

## 2019-11-26 PROBLEM — F84.0 AUTISM: Status: ACTIVE | Noted: 2019-11-26

## 2019-12-02 ENCOUNTER — TELEPHONE (OUTPATIENT)
Dept: ENT CLINIC | Age: 24
End: 2019-12-02

## 2019-12-26 ENCOUNTER — TELEPHONE (OUTPATIENT)
Dept: ENT CLINIC | Age: 24
End: 2019-12-26

## 2020-03-16 ENCOUNTER — APPOINTMENT (OUTPATIENT)
Dept: CT IMAGING | Age: 25
End: 2020-03-16
Payer: MEDICARE

## 2020-03-16 ENCOUNTER — HOSPITAL ENCOUNTER (EMERGENCY)
Age: 25
Discharge: HOME | End: 2020-03-16
Payer: MEDICARE

## 2020-03-16 VITALS
RESPIRATION RATE: 16 BRPM | TEMPERATURE: 97.8 F | SYSTOLIC BLOOD PRESSURE: 106 MMHG | DIASTOLIC BLOOD PRESSURE: 51 MMHG | HEIGHT: 71 IN | OXYGEN SATURATION: 98 % | BODY MASS INDEX: 23.8 KG/M2 | WEIGHT: 170 LBS | HEART RATE: 76 BPM

## 2020-03-16 PROCEDURE — 99283 EMERGENCY DEPT VISIT LOW MDM: CPT

## 2020-03-16 PROCEDURE — 70450 CT HEAD/BRAIN W/O DYE: CPT

## 2020-03-16 RX ORDER — AMOXICILLIN AND CLAVULANATE POTASSIUM 250; 62.5 MG/5ML; MG/5ML
500 POWDER, FOR SUSPENSION ORAL 2 TIMES DAILY
Qty: 200 ML | Refills: 0 | Status: SHIPPED | OUTPATIENT
Start: 2020-03-16 | End: 2020-03-26

## 2020-03-16 ASSESSMENT — ENCOUNTER SYMPTOMS
VOMITING: 0
NAUSEA: 0

## 2020-03-16 ASSESSMENT — VISUAL ACUITY: OU: 1

## 2020-03-16 NOTE — ED PROVIDER NOTES
History reviewed. No pertinent surgical history. CURRENT MEDICATIONS       Previous Medications    ACETAMINOPHEN (TYLENOL) 325 MG TABLET    Take 650 mg by mouth every 6 hours as needed for Pain    ALBUTEROL SULFATE HFA (PROAIR HFA) 108 (90 BASE) MCG/ACT INHALER    Use 2 puffs every 4 hours while awake for  PRN cough/wheezing  Dispense with SPACER and Instruct on use. May sub Ventolin or Proventil as needed per [employer]. ALUMINUM & MAGNESIUM HYDROXIDE-SIMETHICONE (MAALOX) 200-200-20 MG/5ML SUSP SUSPENSION    Take 5 mLs by mouth every 6 hours as needed for Indigestion    BENZOCAINE-MENTHOL (CHLORASEPTIC) 6-10 MG LOZG LOZENGE    Take 1 lozenge by mouth every 2 hours as needed for Sore Throat    CAMPHOR-EUCALYPTUS-MENTHOL (VICKS VAPORUB EX)    Apply topically 3 times daily as needed    DEXTROMETHORPHAN-GUAIFENESIN  MG/5ML SYRP    Take 5 mLs by mouth every 4 hours as needed for Cough    FLUTICASONE (FLONASE) 50 MCG/ACT NASAL SPRAY    1 spray by Each Nare route daily    LOPERAMIDE (ANTI-DIARRHEAL) 2 MG TABLET    Take 2 mg by mouth 4 times daily as needed for Diarrhea    LORATADINE (CLARITIN) 10 MG TABLET    Take 10 mg by mouth daily    MAGNESIUM HYDROXIDE (MILK OF MAGNESIA CONCENTRATE) 2400 MG/10ML SUSP    Take 2,400 mg by mouth once as needed    PHENOL 1.4 % LIQD MOUTH SPRAY    Take 1 drop by mouth once    PSEUDOEPHEDRINE (SUDAFED 12 HR) 120 MG EXTENDED RELEASE TABLET    Take 120 mg by mouth every 12 hours    RISPERIDONE (RISPERDAL) 1 MG TABLET    Take 1.5 mg by mouth 3 times daily     TOLNAFTATE (TINACTIN) 1 % SPRAY    Apply topically 2 times daily Apply topically 2 times daily. TRAZODONE (DESYREL) 100 MG TABLET    Take 200 mg by mouth nightly         ALLERGIES     Ziprasidone hcl    FAMILY HISTORY     History reviewed. No pertinent family history.       SOCIAL HISTORY       Social History     Socioeconomic History    Marital status: Single     Spouse name: None    Number of children: None    grossly intact. Gaze aligned appropriately. Right eye: No discharge. Left eye: No discharge. Conjunctiva/sclera: Conjunctivae normal.      Pupils: Pupils are equal, round, and reactive to light. Pupils are equal.   Neck:      Musculoskeletal: Normal range of motion. Cardiovascular:      Rate and Rhythm: Normal rate and regular rhythm. Heart sounds: Normal heart sounds. No murmur. No friction rub. No gallop. Pulmonary:      Effort: Pulmonary effort is normal. No respiratory distress. Breath sounds: Normal breath sounds. No wheezing. Abdominal:      General: Bowel sounds are normal. There is no distension. Palpations: Abdomen is soft. Tenderness: There is no abdominal tenderness. Musculoskeletal: Normal range of motion. Cervical back: He exhibits no tenderness and no bony tenderness. Skin:     General: Skin is warm and dry. Neurological:      Mental Status: He is alert. Mental status is at baseline. Comments: Unable to perform detailed neurological exam due to baseline MR         DIAGNOSTIC RESULTS     RADIOLOGY:  Interpretation per the Radiologist below, if available at the time of this note:    CT Head WO Contrast   Final Result   1. No evidence of intracranial injury   2. Sphenoid sinusitis and right-sided otomastoiditis           Ct Head Wo Contrast    Result Date: 3/16/2020  EXAMINATION: CT OF THE HEAD WITHOUT CONTRAST  3/16/2020 9:33 am TECHNIQUE: CT of the head was performed without the administration of intravenous contrast. Dose modulation, iterative reconstruction, and/or weight based adjustment of the mA/kV was utilized to reduce the radiation dose to as low as reasonably achievable. COMPARISON: 11/11/2019 HISTORY: ORDERING SYSTEM PROVIDED HISTORY: head injury no LOC TECHNOLOGIST PROVIDED HISTORY: If patient is on cardiac monitor and/or pulse ox, they may be taken off cardiac monitor and pulse ox, left on O2 if currently on.  All monitors reattached when patient returns to room. Has a \"code stroke\" or \"stroke alert\" been called? ->No Reason for exam:->head injury no LOC Reason for Exam: hit head, red bump to forehead,  no LOC Acuity: Acute Type of Exam: Initial FINDINGS: BRAIN/VENTRICLES: No hemorrhage, edema, or mass effect. Stable appearance of the brain ORBITS: The visualized portion of the orbits demonstrate no acute abnormality. SINUSES: New mucosal thickening in the sphenoid sinuses and fluid in the right middle ear cavity and mastoid air cells. SOFT TISSUES/SKULL:  No acute abnormality of the visualized skull or soft tissues. 1. No evidence of intracranial injury 2. Sphenoid sinusitis and right-sided otomastoiditis     EMERGENCY DEPARTMENT COURSE and DIFFERENTIAL DIAGNOSIS/MDM:   Vitals:    Vitals:    03/16/20 0902   BP: (!) 106/51   Pulse: 76   Resp: 16   Temp: 97.8 °F (36.6 °C)   TempSrc: Oral   SpO2: 98%   Weight: 170 lb (77.1 kg)   Height: 5' 11\" (1.803 m)       Patient was given the following medications:  Medications - No data to display      Afebrile, stable, patient presents to the ED for evaluation. Nontoxic no acute distress. Brought in from nursing home after self-induced head injury patient is unable to provide history. Nursing home staff report patient has been acting consistent with baseline no episodes of vomiting. Pupils are equal and reactive. No evidence of skull fracture or bony instability no cervical spine tenderness, no evidence of trauma on physical exam.  Vital signs are within normal limits. CT imaging of the head is ordered to rule out a skull fracture subdural hematoma, subarachnoid hemorrhage or other traumatic head injury. CT imaging shows no acute findings with the exception of sinusitis and otitis. Patient will be started on antibiotics. Discussed findings with caregiver at bedside advised close follow-up and return for any change in symptoms.    Patients PO2 is SpO2 on room air 98% on room air  they

## 2020-04-01 ENCOUNTER — APPOINTMENT (OUTPATIENT)
Dept: CT IMAGING | Age: 25
End: 2020-04-01
Payer: MEDICARE

## 2020-04-01 ENCOUNTER — HOSPITAL ENCOUNTER (EMERGENCY)
Age: 25
Discharge: HOME | End: 2020-04-02
Attending: EMERGENCY MEDICINE
Payer: MEDICARE

## 2020-04-01 VITALS
DIASTOLIC BLOOD PRESSURE: 86 MMHG | SYSTOLIC BLOOD PRESSURE: 120 MMHG | TEMPERATURE: 98.8 F | HEART RATE: 63 BPM | OXYGEN SATURATION: 100 % | RESPIRATION RATE: 16 BRPM

## 2020-04-01 LAB
BACTERIA: ABNORMAL /HPF
BILIRUBIN URINE: NEGATIVE
BLOOD, URINE: ABNORMAL
CLARITY: CLEAR
COLOR: YELLOW
GLUCOSE URINE: NEGATIVE MG/DL
KETONES, URINE: NEGATIVE MG/DL
LEUKOCYTE ESTERASE, URINE: NEGATIVE
MICROSCOPIC EXAMINATION: YES
MUCUS: ABNORMAL /LPF
NITRITE, URINE: NEGATIVE
PH UA: 7.5 (ref 5–8)
PROTEIN UA: NEGATIVE MG/DL
RBC UA: ABNORMAL /HPF (ref 0–4)
SPECIFIC GRAVITY UA: 1.02 (ref 1–1.03)
URINE TYPE: ABNORMAL
UROBILINOGEN, URINE: 0.2 E.U./DL
WBC UA: ABNORMAL /HPF (ref 0–5)

## 2020-04-01 PROCEDURE — 99284 EMERGENCY DEPT VISIT MOD MDM: CPT

## 2020-04-01 PROCEDURE — 70450 CT HEAD/BRAIN W/O DYE: CPT

## 2020-04-01 PROCEDURE — 81001 URINALYSIS AUTO W/SCOPE: CPT

## 2020-04-01 PROCEDURE — 87086 URINE CULTURE/COLONY COUNT: CPT

## 2020-04-01 RX ORDER — HEPATITIS B VACCINE (RECOMBINANT) 20 UG/ML
20 INJECTION, SUSPENSION INTRAMUSCULAR ONCE
COMMUNITY

## 2020-04-01 ASSESSMENT — PAIN SCALES - WONG BAKER: WONGBAKER_NUMERICALRESPONSE: 2

## 2020-04-01 ASSESSMENT — PAIN DESCRIPTION - LOCATION: LOCATION: HEAD

## 2020-04-02 LAB — URINE CULTURE, ROUTINE: NORMAL

## 2020-04-02 ASSESSMENT — VISUAL ACUITY: OU: 1

## 2020-04-02 NOTE — ED NOTES
Called pt home care nurse fermin, she is waiting on hold to talk to [address] [place] [address]. PA notified.       Long Singleton RN  04/01/20 1502

## 2020-04-02 NOTE — ED NOTES
Straight cath performed on pt. Also cleaned wax out of ears as ordered. Tolerated well. Pt has call light in reach and is lying in bed.       Humaira Garcia RN  04/01/20 9297

## 2020-04-02 NOTE — ED PROVIDER NOTES
of this dictation. EKG: All EKG's are interpreted by the Emergency Department Physician who either signs orCo-signs this chart in the absence of a cardiologist.  Please see their note for interpretation of EKG. RADIOLOGY:   Non-plain film images such as CT, Ultrasound and MRI are read by the radiologist. Asim Sanchez radiographic images are visualized andpreliminarily interpreted by the  ED Provider with the below findings:        Interpretation perthe Radiologist below, if available at the time of this note:    CT Head WO Contrast   Final Result   No acute intracranial abnormality. Persistent sphenoid sinusitis and right   mastoid effusion. Ct Head Wo Contrast    Result Date: 4/1/2020  EXAMINATION: CT OF THE HEAD WITHOUT CONTRAST  4/1/2020 9:48 pm TECHNIQUE: CT of the head was performed without the administration of intravenous contrast. Dose modulation, iterative reconstruction, and/or weight based adjustment of the mA/kV was utilized to reduce the radiation dose to as low as reasonably achievable. COMPARISON: 03/16/2020 HISTORY: ORDERING SYSTEM PROVIDED HISTORY: trauma Reason for Exam: head injury FINDINGS: BRAIN/VENTRICLES: No acute intracranial hemorrhage, mass effect or midline shift. No abnormal extra-axial fluid collection. The gray-white differentiation is maintained without evidence of an acute infarct. Ventricles are stable in appearance. Persistent cavum vergae ORBITS: The visualized portion of the orbits demonstrate no acute abnormality. SINUSES: Moderate to severe right and mild left mucosal thickening of the sphenoid sinuses. Remainder of sinuses appear well aerated. Stable moderate to severe opacification of the right mastoid air cells. Left mastoid air cells are well aerated. SOFT TISSUES/SKULL:  No acute abnormality of the visualized skull or soft tissues. No acute intracranial abnormality. Persistent sphenoid sinusitis and right mastoid effusion.           PROCEDURES   Unless otherwise noted below, none     Procedures    CRITICAL CARE TIME   N/A    CONSULTS:  None      EMERGENCY DEPARTMENT COURSE and DIFFERENTIALDIAGNOSIS/MDM:   Vitals:    Vitals:    04/01/20 1943   BP: 120/86   Pulse: 63   Resp: 16   Temp: 98.8 °F (37.1 °C)   TempSrc: Axillary   SpO2: 100%       Patient was given thefollowing medications:  Medications - No data to display    Patient is a 60-year-old male who presents for evaluation of head injury. On exam he is alert, cooperative he is afebrile. There is no evidence of basilar skull fracture. Given patient's status of being a poor historian, CT head was obtained, no evidence of acute intracranial abnormality. I spoke with caregiver who notes that patient sometimes acts this way when he has an ear infection, patient's ears were irrigated, I do not see any sign of acute otitis media or externa. Urinalysis obtained, no evidence of acute infection. At this time I believe patient's reasonable candidate for discharge home. Strict return precautions advised. Based on patient's clinical history and clinical findings, and absence of peritonitis, sepsis, or toxicity I currently estimate there is low risk for: Intracranial hemorrhage, intra-abdominal injury, perforated bowel, subdural hematoma, AAA, TAA, cauda equina or central cord syndrome, compartment syndrome, epidural mass or lesion, herniated disc causing severe stenosis, tendon or NV injury, fracture or dislocation. We have discussed the symptoms which are most concerning (e.g., bloody stool, fever, changing or worsening pain, vomiting) that necessitate immediate return. FINAL IMPRESSION      1.  Closed head injury, initial encounter          DISPOSITION/PLAN   DISPOSITION Decision To Discharge 04/02/2020 01:01:05 AM      PATIENT REFERREDTO:  his PCP    Go to   for evaluation    [facility] ED  [address] [address]  [phone]  Go to   If symptoms worsen      DISCHARGE MEDICATIONS:  Discharge Medication List as of 4/2/2020  1:02 AM          DISCONTINUED MEDICATIONS:  Discharge Medication List as of 4/2/2020  1:02 AM                 (Please note that portions ofthis note were completed with a voice recognition program.  Efforts were made to edit the dictations but occasionally words are mis-transcribed.)    CAMILA Zhang (electronically signed)        David Zhang  04/02/20 0781

## 2020-04-23 ENCOUNTER — APPOINTMENT (OUTPATIENT)
Dept: GENERAL RADIOLOGY | Age: 25
End: 2020-04-23
Payer: MEDICARE

## 2020-04-23 ENCOUNTER — APPOINTMENT (OUTPATIENT)
Dept: CT IMAGING | Age: 25
End: 2020-04-23
Payer: MEDICARE

## 2020-04-23 ENCOUNTER — HOSPITAL ENCOUNTER (EMERGENCY)
Age: 25
Discharge: HOME | End: 2020-04-23
Payer: MEDICARE

## 2020-04-23 VITALS
TEMPERATURE: 96.4 F | BODY MASS INDEX: 23.8 KG/M2 | HEART RATE: 76 BPM | OXYGEN SATURATION: 100 % | SYSTOLIC BLOOD PRESSURE: 101 MMHG | DIASTOLIC BLOOD PRESSURE: 62 MMHG | RESPIRATION RATE: 16 BRPM | HEIGHT: 71 IN | WEIGHT: 170 LBS

## 2020-04-23 PROCEDURE — 6370000000 HC RX 637 (ALT 250 FOR IP): Performed by: PHYSICIAN ASSISTANT

## 2020-04-23 PROCEDURE — 99284 EMERGENCY DEPT VISIT MOD MDM: CPT

## 2020-04-23 PROCEDURE — 70486 CT MAXILLOFACIAL W/O DYE: CPT

## 2020-04-23 PROCEDURE — 71045 X-RAY EXAM CHEST 1 VIEW: CPT

## 2020-04-23 PROCEDURE — 70450 CT HEAD/BRAIN W/O DYE: CPT

## 2020-04-23 PROCEDURE — 12013 RPR F/E/E/N/L/M 2.6-5.0 CM: CPT

## 2020-04-23 PROCEDURE — 72125 CT NECK SPINE W/O DYE: CPT

## 2020-04-23 RX ADMIN — Medication 2.25 ML: at 15:38

## 2020-04-23 NOTE — ED NOTES
Bed: 13  Expected date:   Expected time:   Means of arrival:   Comments:     Festus Gonzales RN  04/23/20 1880

## 2020-04-24 ENCOUNTER — CARE COORDINATION (OUTPATIENT)
Dept: CARE COORDINATION | Age: 25
End: 2020-04-24

## 2020-04-24 NOTE — CARE COORDINATION
ED follow up call completed. ACM reached out to the group home for follow up . Patient is doing better today, less agitation. He does have his bandage on his head intake. No additional flu like symptoms. Group home stated that they are keeping him  at least 6 feet away from other residents and have a protocol since he was in hospital setting. Patient Hotline provided as well. Patient contacted regarding recent discharge and COVID-19 risk   Care Transition Nurse/ Ambulatory Care Manager contacted the patient by telephone to perform post discharge assessment. Verified name and  with patient as identifiers. Patient has following risk factors of:  CTN/ACM reviewed discharge instructions, medical action plan and red flags related to discharge diagnosis. Reviewed and educated them on any new and changed medications related to discharge diagnosis. Advised obtaining a 90-day supply of all daily and as-needed medications. Education provided regarding infection prevention, and signs and symptoms of COVID-19 and when to seek medical attention with patient who verbalized understanding. Discussed exposure protocols and quarantine from [address] you at higher risk for severe illness  and given an opportunity for questions and concerns. The patient agrees to contact the COVID-19 hotline [phone] or PCP office for questions related to their healthcare. CTN/ACM provided contact information for future reference. From CDC: Are you at higher risk for severe illness?  Wash your hands often.  Avoid close contact (6 feet, which is about two arm lengths) with people who are sick.  Put distance between yourself and other people if COVID-19 is spreading in your community.  Clean and disinfect frequently touched surfaces.  Avoid all cruise travel and non-essential air travel.    Call your healthcare professional if you have concerns about COVID-19 and your underlying condition or if you are sick. For more information on steps you can take to protect yourself, see CDC's How to [place] for follow-up call in 7-14 days based on severity of symptoms and risk factors.

## 2020-04-25 NOTE — ED PROVIDER NOTES
going on for several months. This is not a new thing for him. He denies fevers chills nausea vomiting. Denies any change in behavior per caregiver. Caregiver reports he still has an appetite and is eating and drinking appropriately. The caregiver denies any other complaints at this time. Nursing Notes were all reviewed and agreed with or any disagreements were addressed in the HPI. REVIEW OF SYSTEMS    (2-9 systems for level 4, 10 or more for level 5)     Review of Systems   Unable to perform ROS: Other       Positives and Pertinent negatives as per HPI. Except as noted above in the ROS, all other systems were reviewed and negative. PAST MEDICAL HISTORY     Past Medical History:   Diagnosis Date    Ataxia     Autism     Cerebral artery occlusion with cerebral infarction ([facility] [address]     Encephalopathy          SURGICAL HISTORY   History reviewed. No pertinent surgical history. [address]       Discharge Medication List as of 4/23/2020  7:43 PM      CONTINUE these medications which have NOT CHANGED    Details   hepatitis B vaccine (ENGERIX-B) 20 MCG/ML injection Inject 20 mcg into the muscle onceHistorical Med      Nutritional Supplements (ENSURE ACTIVE PO) Take by mouthHistorical Med      phenol 1.4 % LIQD mouth spray Take 1 drop by mouth onceHistorical Med      albuterol sulfate HFA (PROAIR HFA) 108 (90 Base) MCG/ACT inhaler Use 2 puffs every 4 hours while awake for  PRN cough/wheezing  Dispense with SPACER and Instruct on use.   May sub Ventolin or Proventil as needed per Insurance., Disp-1 Inhaler, R-1Print      risperiDONE (RISPERDAL) 1 MG tablet Take 1.5 mg by mouth 3 times daily Historical Med      loperamide (ANTI-DIARRHEAL) 2 MG tablet Take 2 mg by mouth 4 times daily as needed for DiarrheaHistorical Med      Benzocaine-Menthol (CHLORASEPTIC) 6-10 MG LOZG lozenge Take 1 lozenge by mouth every 2 hours as needed for Sore ThroatHistorical Med      Dextromethorphan-guaiFENesin  swelling, tenderness, crepitus or edema. Abdominal:      General: Abdomen is flat. Bowel sounds are normal.      Palpations: Abdomen is soft. Tenderness: There is no abdominal tenderness. There is no right CVA tenderness, left CVA tenderness, guarding or rebound. Musculoskeletal: Normal range of motion. General: No deformity. Right lower leg: No edema. Left lower leg: No edema. Skin:     General: Skin is warm and dry. Neurological:      General: No focal deficit present. Mental Status: He is alert and oriented to person, place, and time. Mental status is at baseline. GCS: GCS eye subscore is 4. GCS verbal subscore is 5. GCS motor subscore is 6. Cranial Nerves: Cranial nerves are intact. Psychiatric:         Behavior: Behavior normal. Behavior is cooperative. DIAGNOSTIC RESULTS   LABS:    Labs Reviewed - No data to display    All other labs were within normal range or not returned as of this dictation. EKG: All EKG's are interpreted by the Emergency Department Physician in the absence of a cardiologist.  Please see their note for interpretation of EKG. RADIOLOGY:   Non-plain film images such as CT, Ultrasound and MRI are read by the radiologist. Plain radiographic images are visualized and preliminarily interpreted by the ED Provider with the below findings:        Interpretation per the Radiologist below, if available at the time of this note:    XR CHEST PORTABLE   Final Result   No acute cardiopulmonary pathology. CT FACIAL BONES WO CONTRAST   Final Result   No acute traumatic injury of the facial bones. CT Cervical Spine WO Contrast   Final Result   Normal CT cervical spine. CT Head WO Contrast   Final Result   No acute intracranial abnormality.            Ct Head Wo Contrast    Result Date: 4/23/2020  EXAMINATION: CT OF THE HEAD WITHOUT CONTRAST  4/23/2020 1:04 pm TECHNIQUE: CT of the head was performed without the intact. The mandible is intact. The mandibular condyles are normally situated. The nasal bones and maxillary nasal processes are intact. ORBITS:  The globes appear intact. The extraocular muscles, optic nerve sheath complexes and lacrimal glands appear unremarkable. No retrobulbar hematoma or mass is seen. The orbital walls and rims are intact. SINUSES/MASTOIDS:  The paranasal sinuses and mastoid air cells are well aerated. No acute fracture is seen. SOFT TISSUES:  No appreciable facial soft tissue swelling is seen. No acute traumatic injury of the facial bones. Ct Cervical Spine Wo Contrast    Result Date: 4/23/2020  EXAMINATION: CT OF THE CERVICAL SPINE WITHOUT CONTRAST 4/23/2020 4:05 pm TECHNIQUE: CT of the cervical spine was performed without the administration of intravenous contrast. Multiplanar reformatted images are provided for review. Dose modulation, iterative reconstruction, and/or weight based adjustment of the mA/kV was utilized to reduce the radiation dose to as low as reasonably achievable. COMPARISON: None. HISTORY: ORDERING SYSTEM PROVIDED HISTORY: fall TECHNOLOGIST PROVIDED HISTORY: If patient is on cardiac monitor and/or pulse ox, they may be taken off cardiac monitor and pulse ox, left on O2 if currently on. All monitors reattached when patient returns to room. Reason for exam:->fall Reason for Exam: fall Acuity: Acute Type of Exam: Initial FINDINGS: BONES/ALIGNMENT: There is no acute fracture or traumatic malalignment. DEGENERATIVE CHANGES: No significant degenerative changes. SOFT TISSUES: There is no prevertebral soft tissue swelling. Normal CT cervical spine.      Xr Chest Portable    Result Date: 4/23/2020  EXAMINATION: ONE XRAY VIEW OF THE CHEST 4/23/2020 5:08 pm COMPARISON: 11/14/2019 HISTORY: ORDERING SYSTEM PROVIDED HISTORY: cough TECHNOLOGIST PROVIDED HISTORY: Reason for exam:->cough Reason for Exam: pt is coughing Acuity: Acute Type of Exam: Initial FINDINGS: Single portable frontal view of the chest is submitted for review. The cardiac silhouette is normal in size. Lung parenchyma is clear without focal airspace consolidation, sizeable pleural effusion, or pneumothorax. Trachea is midline. Visualized osseous structures and soft tissues are grossly intact. No acute cardiopulmonary pathology. PROCEDURES   Unless otherwise noted below, none     Lac Repair  Date/Time: 4/25/2020 11:11 AM  Performed by: Bryan Nguyen PA-C  Authorized by: Bryan Nguyen PA-C     Consent:     Consent obtained:  Verbal    Consent given by:  Patient    Risks discussed:  Infection, need for additional repair, pain, poor cosmetic result, poor wound healing, retained foreign body, tendon damage, vascular damage and nerve damage    Alternatives discussed:  No treatment, delayed treatment, observation and referral  Anesthesia (see MAR for exact dosages):      Anesthesia method:  Topical application    Topical anesthetic:  LET  Laceration details:     Location:  Face    Face location:  Chin    Length (cm):  3  Repair type:     Repair type:  Simple  Pre-procedure details:     Preparation:  Patient was prepped and draped in usual sterile fashion  Exploration:     Hemostasis achieved with:  Direct pressure    Wound exploration: wound explored through full range of motion and entire depth of wound probed and visualized      Wound extent: no areolar tissue violation noted, no fascia violation noted, no foreign bodies/material noted, no muscle damage noted, no nerve damage noted, no tendon damage noted, no underlying fracture noted and no vascular damage noted      Contaminated: no    Treatment:     Area cleansed with:  Saline and Hibiclens    Amount of cleaning:  Standard    Irrigation solution:  Sterile saline    Irrigation volume:  100    Irrigation method:  Pressure wash    Visualized foreign bodies/material removed: no    Skin repair:     Repair method:  Sutures    Suture size:  4-0 to discharge. I did tell the caregiver to just clean with soap and water and keep the laceration clean and dry at home. Patient was told return immediately to the ER if he experience any new or worsening symptoms and was educated on when to return. The caregiver verbalized understanding and I did feel comfortable sending this patient home with close follow-up instructions and strict return precautions. Patient stable at time of discharge. FINAL IMPRESSION      1. Injury of head, initial encounter    2.  Chin laceration, initial encounter          DISPOSITION/PLAN   DISPOSITION Decision To Discharge 04/23/2020 06:04:02 PM      PATIENT [facility]TO:  [place] [facility]  [address] [facility]  [phone]  Schedule an appointment as soon as possible for a visit   As needed, If symptoms worsen      DISCHARGE MEDICATIONS:  Discharge Medication List as of 4/23/2020  7:43 PM          DISCONTINUED MEDICATIONS:  Discharge Medication List as of 4/23/2020  7:43 PM                 (Please note that portions of this note were completed with a voice recognition program.  Efforts were made to edit the dictations but occasionally words are mis-transcribed.)    Jesu Yoder PA-C (electronically signed)            Jesu Yoder PA-C  04/25/20 1112

## 2020-07-09 ENCOUNTER — HOSPITAL ENCOUNTER (OUTPATIENT)
Dept: GENERAL RADIOLOGY | Age: 25
Discharge: HOME | End: 2020-07-09
Payer: MEDICARE

## 2020-07-09 PROCEDURE — 74230 X-RAY XM SWLNG FUNCJ C+: CPT

## 2020-07-11 ENCOUNTER — HOSPITAL ENCOUNTER (OUTPATIENT)
Age: 25
Setting detail: OBSERVATION
Discharge: NURSING, REHAB OR LONG-TERM CARE | End: 2020-07-16
Attending: EMERGENCY MEDICINE | Admitting: INTERNAL MEDICINE
Payer: MEDICARE

## 2020-07-11 PROBLEM — R13.10 DYSPHAGIA: Status: ACTIVE | Noted: 2020-07-11

## 2020-07-11 LAB
A/G RATIO: 2 (ref 1.1–2.2)
ALBUMIN SERPL-MCNC: 4.7 G/DL (ref 3.4–5)
ALP BLD-CCNC: 67 U/L (ref 40–129)
ALT SERPL-CCNC: 15 U/L (ref 10–40)
ANION GAP SERPL CALCULATED.3IONS-SCNC: 13 MMOL/L (ref 3–16)
AST SERPL-CCNC: 13 U/L (ref 15–37)
BASOPHILS ABSOLUTE: 0.1 K/UL (ref 0–0.2)
BASOPHILS RELATIVE PERCENT: 0.9 %
BILIRUB SERPL-MCNC: 0.5 MG/DL (ref 0–1)
BILIRUBIN URINE: NEGATIVE
BLOOD, URINE: NEGATIVE
BUN BLDV-MCNC: 10 MG/DL (ref 7–20)
CALCIUM SERPL-MCNC: 9.8 MG/DL (ref 8.3–10.6)
CHLORIDE BLD-SCNC: 101 MMOL/L (ref 99–110)
CLARITY: ABNORMAL
CO2: 25 MMOL/L (ref 21–32)
COLOR: YELLOW
CREAT SERPL-MCNC: 0.6 MG/DL (ref 0.9–1.3)
EOSINOPHILS ABSOLUTE: 0 K/UL (ref 0–0.6)
EOSINOPHILS RELATIVE PERCENT: 0.7 %
GFR AFRICAN AMERICAN: >60
GFR NON-AFRICAN AMERICAN: >60
GLOBULIN: 2.4 G/DL
GLUCOSE BLD-MCNC: 95 MG/DL (ref 70–99)
GLUCOSE URINE: NEGATIVE MG/DL
HCT VFR BLD CALC: 40.8 % (ref 40.5–52.5)
HEMOGLOBIN: 14 G/DL (ref 13.5–17.5)
KETONES, URINE: NEGATIVE MG/DL
LEUKOCYTE ESTERASE, URINE: NEGATIVE
LYMPHOCYTES ABSOLUTE: 1.5 K/UL (ref 1–5.1)
LYMPHOCYTES RELATIVE PERCENT: 24.6 %
MCH RBC QN AUTO: 29.7 PG (ref 26–34)
MCHC RBC AUTO-ENTMCNC: 34.3 G/DL (ref 31–36)
MCV RBC AUTO: 86.7 FL (ref 80–100)
MICROSCOPIC EXAMINATION: ABNORMAL
MONOCYTES ABSOLUTE: 0.6 K/UL (ref 0–1.3)
MONOCYTES RELATIVE PERCENT: 8.9 %
NEUTROPHILS ABSOLUTE: 4 K/UL (ref 1.7–7.7)
NEUTROPHILS RELATIVE PERCENT: 64.9 %
NITRITE, URINE: NEGATIVE
PDW BLD-RTO: 13.9 % (ref 12.4–15.4)
PH UA: 7 (ref 5–8)
PLATELET # BLD: 163 K/UL (ref 135–450)
PMV BLD AUTO: 9.8 FL (ref 5–10.5)
POTASSIUM REFLEX MAGNESIUM: 4.1 MMOL/L (ref 3.5–5.1)
PROTEIN UA: NEGATIVE MG/DL
RBC # BLD: 4.7 M/UL (ref 4.2–5.9)
SODIUM BLD-SCNC: 139 MMOL/L (ref 136–145)
SPECIFIC GRAVITY UA: 1.02 (ref 1–1.03)
TOTAL PROTEIN: 7.1 G/DL (ref 6.4–8.2)
URINE REFLEX TO CULTURE: ABNORMAL
URINE TYPE: ABNORMAL
UROBILINOGEN, URINE: 2 E.U./DL
WBC # BLD: 6.2 K/UL (ref 4–11)

## 2020-07-11 PROCEDURE — 80053 COMPREHEN METABOLIC PANEL: CPT

## 2020-07-11 PROCEDURE — 81003 URINALYSIS AUTO W/O SCOPE: CPT

## 2020-07-11 PROCEDURE — 99284 EMERGENCY DEPT VISIT MOD MDM: CPT

## 2020-07-11 PROCEDURE — 2580000003 HC RX 258: Performed by: INTERNAL MEDICINE

## 2020-07-11 PROCEDURE — 6360000002 HC RX W HCPCS: Performed by: INTERNAL MEDICINE

## 2020-07-11 PROCEDURE — 96372 THER/PROPH/DIAG INJ SC/IM: CPT

## 2020-07-11 PROCEDURE — 85025 COMPLETE CBC W/AUTO DIFF WBC: CPT

## 2020-07-11 PROCEDURE — G0378 HOSPITAL OBSERVATION PER HR: HCPCS

## 2020-07-11 PROCEDURE — 1200000000 HC SEMI PRIVATE

## 2020-07-11 PROCEDURE — 2580000003 HC RX 258: Performed by: PHYSICIAN ASSISTANT

## 2020-07-11 RX ORDER — ONDANSETRON 2 MG/ML
4 INJECTION INTRAMUSCULAR; INTRAVENOUS EVERY 6 HOURS PRN
Status: DISCONTINUED | OUTPATIENT
Start: 2020-07-11 | End: 2020-07-16 | Stop reason: HOSPADM

## 2020-07-11 RX ORDER — SODIUM CHLORIDE 0.9 % (FLUSH) 0.9 %
10 SYRINGE (ML) INJECTION EVERY 12 HOURS SCHEDULED
Status: DISCONTINUED | OUTPATIENT
Start: 2020-07-11 | End: 2020-07-16 | Stop reason: HOSPADM

## 2020-07-11 RX ORDER — ACETAMINOPHEN 325 MG/1
650 TABLET ORAL EVERY 6 HOURS PRN
Status: DISCONTINUED | OUTPATIENT
Start: 2020-07-11 | End: 2020-07-16 | Stop reason: HOSPADM

## 2020-07-11 RX ORDER — ACETAMINOPHEN 650 MG/1
650 SUPPOSITORY RECTAL EVERY 6 HOURS PRN
Status: DISCONTINUED | OUTPATIENT
Start: 2020-07-11 | End: 2020-07-16 | Stop reason: HOSPADM

## 2020-07-11 RX ORDER — SODIUM CHLORIDE 0.9 % (FLUSH) 0.9 %
10 SYRINGE (ML) INJECTION PRN
Status: DISCONTINUED | OUTPATIENT
Start: 2020-07-11 | End: 2020-07-16 | Stop reason: HOSPADM

## 2020-07-11 RX ORDER — SODIUM CHLORIDE 9 MG/ML
INJECTION, SOLUTION INTRAVENOUS CONTINUOUS
Status: DISCONTINUED | OUTPATIENT
Start: 2020-07-11 | End: 2020-07-16 | Stop reason: HOSPADM

## 2020-07-11 RX ORDER — POLYETHYLENE GLYCOL 3350 17 G/17G
17 POWDER, FOR SOLUTION ORAL DAILY PRN
Status: DISCONTINUED | OUTPATIENT
Start: 2020-07-11 | End: 2020-07-16 | Stop reason: HOSPADM

## 2020-07-11 RX ORDER — 0.9 % SODIUM CHLORIDE 0.9 %
1000 INTRAVENOUS SOLUTION INTRAVENOUS ONCE
Status: COMPLETED | OUTPATIENT
Start: 2020-07-11 | End: 2020-07-11

## 2020-07-11 RX ORDER — TRAZODONE HYDROCHLORIDE 100 MG/1
200 TABLET ORAL NIGHTLY
Status: DISCONTINUED | OUTPATIENT
Start: 2020-07-11 | End: 2020-07-15

## 2020-07-11 RX ORDER — PROMETHAZINE HYDROCHLORIDE 25 MG/1
12.5 TABLET ORAL EVERY 6 HOURS PRN
Status: DISCONTINUED | OUTPATIENT
Start: 2020-07-11 | End: 2020-07-16 | Stop reason: HOSPADM

## 2020-07-11 RX ADMIN — SODIUM CHLORIDE: 9 INJECTION, SOLUTION INTRAVENOUS at 22:06

## 2020-07-11 RX ADMIN — Medication 10 ML: at 22:06

## 2020-07-11 RX ADMIN — SODIUM CHLORIDE 1000 ML: 9 INJECTION, SOLUTION INTRAVENOUS at 14:10

## 2020-07-11 RX ADMIN — ENOXAPARIN SODIUM 40 MG: 40 INJECTION SUBCUTANEOUS at 22:05

## 2020-07-11 ASSESSMENT — PAIN SCALES - WONG BAKER
WONGBAKER_NUMERICALRESPONSE: 0

## 2020-07-11 NOTE — ED PROVIDER NOTES
I independently performed a history and physical [place] [facility] [place] All diagnostic, treatment, and disposition decisions were made by myself in conjunction with the advanced practice provider. For further details of [facility] emergency department encounter, please see the advance practice provider's documentation. In brief, this is a 27y.o. year old male, with   Chief Complaint   Patient presents with    Other     pt had swallow study done on 7/9 needs g tube placed and 3 day stay for skilled nursing     Patient has a long standing issue with aspiration. He apparently had an outpatient swallow study on 7/9/20 that confirmed aspiration. He currently is in a group home setting. The physician there recommended he come here for admission, G-tube placement, and possible SNF placement. Patient has moderate DD and is non-verbal.  On exam he is happy, awake, watching TV. Follows commands. CTAB.  RRR. Abdomen is soft and non-tender. Patient was subsequently admitted for further care.       Jamel Lindsey MD  07/11/20 8123

## 2020-07-11 NOTE — ED PROVIDER NOTES
Kaylynn 52        Pt Name: Sharon Thao  MRN: [id number]  [place] 1995  Date of evaluation: 7/11/2020  Provider: Aleena Diaz PA-C  PCP: Latonya George MD  ED Attending: No att. providers found      This patient was seen and evaluated by the attending physician No att. providers found. I have not independently evaluated this patient. CHIEF COMPLAINT       Chief Complaint   Patient presents with    Other     pt had swallow study done on 7/9 needs g tube placed and 3 day stay for skilled nursing       HISTORY OF PRESENT ILLNESS   (Location/Symptom, Timing/Onset, Context/Setting, Quality, Duration, Modifying Factors, Severity)  Note limiting factors. Sharon Thao is a 27 y.o. male with a history of autism, significant developmental disability,  cerebral infarct, ataxia, aspiration for evaluation accompanied by  from [facility] where pt resides, who helps give history patient has baseline autism and is unable to provide history, advised that patient is having swallowing difficulties and needs to have a G-tube placed. Patient had a fall with jaw fracture in September 2019, he has had worsening dysphagia since this incident occurred. His diet includes puréed honey thick liquids via spoon. Patient's mother is the  at the group [facility] where he resides. She is the medical power of  and is aware of patient being here and wants patient to have G-tube. No recent illness. He took patient to [facility] where he was advised he needs to go to an adult hospital.   Pt had a MBS at [facility] performed 07/09/2020, which concluded the patient was that very high risk for aspiration recommended n.p.o. status and alternative means of nutrition. ENTArnel Arzola,   ENT at [address] [place], [place] [address]e [facility], Bridgette Hooks,   02/12/2020 dental surgery, [facility], DDS.        Nursing Notes were all reviewed and agreed with or any disagreements were addressed  in the HPI. REVIEW OF SYSTEMS  (2-9 systems for level 4, 10 or more for level 5)     Review of Systems   Unable to perform ROS: Patient nonverbal       Positivesand Pertinent negatives as per HPI. Except as noted above in the ROS, all other systems were reviewed and negative. PAST MEDICAL HISTORY     Past Medical History:   Diagnosis Date    Ataxia     Autism     Cerebral artery occlusion with cerebral infarction ([facility] [place] [address]     Encephalopathy          SURGICAL HISTORY     History reviewed. No pertinent surgical history. CURRENT MEDICATIONS       Current Discharge Medication List      CONTINUE these medications which have NOT CHANGED    Details   Nutritional Supplements (ENSURE ACTIVE PO) Take by mouth      phenol 1.4 % LIQD mouth spray Take 1 drop by mouth once      albuterol sulfate HFA (PROAIR HFA) 108 (90 Base) MCG/ACT inhaler Use 2 puffs every 4 hours while awake for  PRN cough/wheezing  Dispense with SPACER and Instruct on use. May sub Ventolin or Proventil as needed per [employer]. Qty: 1 Inhaler, Refills: 1      risperiDONE (RISPERDAL) 1 MG tablet Take 1.5 mg by mouth 3 times daily       loperamide (ANTI-DIARRHEAL) 2 MG tablet Take 2 mg by mouth 4 times daily as needed for Diarrhea      Benzocaine-Menthol (CHLORASEPTIC) 6-10 MG LOZG lozenge Take 1 lozenge by mouth every 2 hours as needed for Sore Throat      Dextromethorphan-guaiFENesin  MG/5ML SYRP Take 5 mLs by mouth every 4 hours as needed for Cough      aluminum & magnesium hydroxide-simethicone (MAALOX) 832-809-32 MG/5ML SUSP suspension Take 5 mLs by mouth every 6 hours as needed for Indigestion      magnesium hydroxide (MILK OF MAGNESIA CONCENTRATE) 2400 MG/10ML SUSP Take 2,400 mg by mouth once as needed      pseudoephedrine (SUDAFED 12 HR) 120 MG extended release tablet Take 120 mg by mouth every 12 hours      tolnaftate (TINACTIN) 1 % spray Apply topically 2 times daily Apply topically 2 times daily. traZODone (DESYREL) 100 MG tablet Take 100 mg by mouth nightly       fluticasone (FLONASE) 50 MCG/ACT nasal spray 1 spray by Each Nare route daily      acetaminophen (TYLENOL) 325 MG tablet Take 650 mg by mouth every 6 hours as needed for Pain      Camphor-Eucalyptus-Menthol (VICKS VAPORUB EX) Apply topically 3 times daily as needed      hepatitis B vaccine (ENGERIX-B) 20 MCG/ML injection Inject 20 mcg into the muscle once               ALLERGIES     Ziprasidone hcl    FAMILY HISTORY     History reviewed. No pertinent family history.       SOCIAL HISTORY       Social History     Socioeconomic History    Marital status: Single     Spouse name: None    Number of children: None    Years of education: None    Highest education level: None   Occupational History    None   Social Needs    Financial resource strain: None    Food insecurity     Worry: None     Inability: None    Transportation needs     Medical: None     Non-medical: None   Tobacco Use    Smoking status: Never Smoker    Smokeless tobacco: Never Used   Substance and Sexual Activity    Alcohol use: Never     Frequency: Never    Drug use: Never    Sexual activity: Never   Lifestyle    Physical activity     Days per week: None     Minutes per session: None    Stress: None   Relationships    Social connections     Talks on phone: None     Gets together: None     Attends [employer] service: None     Active member of club or organization: None     Attends meetings of clubs or organizations: None     Relationship status: None    Intimate partner violence     Fear of current or ex partner: None     Emotionally abused: None     Physically abused: None     Forced sexual activity: None   Other Topics Concern    None   Social History Narrative    None       SCREENINGS     NIH Score       Gl[place]ow [place] Coma Scale  Eye Opening: Spontaneous  Best Verbal Response: None(patient is nonverbal at baseline)  Best Motor Response: Obeys commands  [place] Coma Scale Score: 11    [facility]     Heart Score         PHYSICAL EXAM    (up to 7 for level 4, 8 ormore for level 5)     ED Triage Vitals [07/11/20 1246]   BP Temp Temp Source Pulse Resp SpO2 Height Weight   110/63 98.1 °F (36.7 °C) Oral 71 16 99 % 5' 8\" (1.727 m) 170 lb (77.1 kg)       Physical Exam  Vitals signs and nursing note reviewed. Constitutional:       General: He is sleeping. Appearance: He is well-developed and underweight. He is not ill-appearing, toxic-appearing or diaphoretic. HENT:      Head: Normocephalic. Nose: Nose normal.      Mouth/Throat:      Pharynx: No oropharyngeal exudate. Eyes:      General:         Right eye: No discharge. Left eye: No discharge. Conjunctiva/sclera: Conjunctivae normal.      Pupils: Pupils are equal, round, and reactive to light. Neck:      Musculoskeletal: Normal range of motion. Cardiovascular:      Rate and Rhythm: Normal rate and regular rhythm. Heart sounds: Normal heart sounds. No murmur. No friction rub. No gallop. Pulmonary:      Effort: Pulmonary effort is normal. No respiratory distress. Breath sounds: Normal breath sounds. No wheezing. Abdominal:      General: Bowel sounds are normal. There is no distension. Palpations: Abdomen is soft. Tenderness: There is no abdominal tenderness. Musculoskeletal: Normal range of motion. Skin:     General: Skin is warm and dry. Neurological:      Mental Status: He is easily aroused.    Psychiatric:         Behavior: Behavior normal.         DIAGNOSTIC RESULTS   LABS:    Labs Reviewed   COMPREHENSIVE METABOLIC PANEL W/ REFLEX TO MG FOR LOW K - Abnormal; Notable for the following components:       Result Value    CREATININE 0.6 (*)     AST 13 (*)     All other components within normal limits    Narrative:     Performed at:  [facility] [address],  [place], [facility]   Phone [phone]   URINE RT REFLEX TO CULTURE - Abnormal; Notable for the following components:    Clarity, UA SL CLOUDY (*)     Urobilinogen, Urine 2.0 (*)     All other components within normal limits    Narrative:     Performed at:  [facility],  [place], [facility]   Phone [phone]   CBC WITH AUTO DIFFERENTIAL    Narrative:     Performed at:  [facility],  [place], [facility]   Phone [phone]       All other labs were within normal range or notreturned as of this dictation. EKG: All EKG's are interpreted by the Emergency Department Physician who either signs or Co-signs this chart in the absence of a cardiologist.  Please see their note for interpretation of EKG. CRITICAL CARE TIME   Total critical care time today provided was 40 minutes. This excludes seperately billable procedures and family discussion time. Provided for pt non verbal, dysphagia, frequent aspiration requiring coordination of care with numerous consults, and re-evaluations. CONSULTS:  GI: Dr Rayray Ayers,   Pts PCP, Dr. Aria Santoyo, advised pt needs to be admitted for placement in a nursing home. I advised we do not have a reason to admit. He requested I speak to pts mother. Spoke again to Dr. Aria Santoyo, he called back and advised that the group home cannot administer tube feeding and so patient will have to go to a nursing home. He is requesting that we have case management get involved and try to place him into a skilled nursing home I advised him that we do not have case management on the weekend. He requested that I talk to the hospitalist and consider an observation admission status. I advised I will make an attempt and I will provide contact information to the hospitalist if he would like to discuss this directly with Dasia Valdez. Pts mother, Gary Walter, discussed plan for admission and tx, she is in agreement with plan.      EMERGENCY DEPARTMENT COURSE patient will be admitted. According to  at the [facility] home they cannot administer parenteral medications or food to the patient therefore the patient will need to go to a skilled nursing home after release from the hospital.  Nursing home staff requested a Kofi button if possible when G-tube is placed due to patient's autism and likelihood of him messing with the tube. Labs are unremarkable patient is admitted in stable condition. Maurilio Kent, spoke with his mother, she advised pt will need a sitter, he is a fall risk, impulsive, will bang his head into the wall. She is requesting somone to sit with him and she advised she will be here as soon as she is allowed due to visitor policy. All questions are answered. The patient tolerated their visit well. They were seen and evaluated by the attendingphysician, No att. providers found who agreed with the assessment and plan. The patient and / or the family were informed of the results of any tests, a time was given to answer questions, a plan was proposed and they agreed Zack Bowser. FINAL IMPRESSION      1. Dysphagia, unspecified type    2.  Aspiration into airway, initial encounter          DISPOSITION/PLAN   DISPOSITION    Admit in stable condition    PATIENT REFERRED TO:  Alan Dempsey MD  [phone]            DISCHARGE MEDICATIONS:  Current Discharge Medication List          DISCONTINUED MEDICATIONS:  Current Discharge Medication List      STOP taking these medications       loratadine (CLARITIN) 10 MG tablet Comments:   Reason for Stopping:                      (Please note that portions of this note were completed with a voice recognition program.  Efforts were made to edit the dictations but occasionally words are mis-transcribed.)    Fang Jeffries PA-C (electronically signed)        Fang Jeffries PA-C  07/12/20 1362

## 2020-07-11 NOTE — ED NOTES
Lesley sent to Dr. Pancho Mensah at [address]  07/11/20 1556    [employer] completed with orders being placed at [address] [facility]  07/11/20 2798

## 2020-07-12 PROCEDURE — 2580000003 HC RX 258: Performed by: INTERNAL MEDICINE

## 2020-07-12 PROCEDURE — G0378 HOSPITAL OBSERVATION PER HR: HCPCS

## 2020-07-12 PROCEDURE — 99220 PR INITIAL OBSERVATION CARE/DAY 70 MINUTES: CPT | Performed by: INTERNAL MEDICINE

## 2020-07-12 RX ORDER — CEFAZOLIN SODIUM 1 G/3ML
2 INJECTION, POWDER, FOR SOLUTION INTRAMUSCULAR; INTRAVENOUS ONCE
Status: DISCONTINUED | OUTPATIENT
Start: 2020-07-13 | End: 2020-07-13

## 2020-07-12 RX ADMIN — SODIUM CHLORIDE: 9 INJECTION, SOLUTION INTRAVENOUS at 09:56

## 2020-07-12 ASSESSMENT — PAIN SCALES - WONG BAKER
WONGBAKER_NUMERICALRESPONSE: 0

## 2020-07-12 NOTE — PROGRESS NOTES
Pt started to bang head on side rails of bed. helmet was placed on patient. Pt had tian very calm most of day not once trying to bang his head. Patient Iv also stopped working, catheter was damanaged. Removed Iv catheter remained intact but was bent.  New Iv placed in R wrist.

## 2020-07-12 NOTE — PROGRESS NOTES
4 Eyes Skin Assessment     The patient is being assess for   low abdiel    I agree that 2 RN's have performed a thorough Head to Toe Skin Assessment on the patient. ALL assessment sites listed below have been assessed. Areas assessed by both nurses:   [x]   Head, Face, and Ears   [x]   Shoulders, Back, and Chest, Abdomen  [x]   Arms, Elbows, and Hands   [x]   Coccyx, Sacrum, and Ischium  [x]   Legs, Feet, and Heels        Noted small red area under patient chin. Coccyx WDL. Heels are pink and blanchable. Some bruising to  R arm where Iv is located. **SHARE this note so that the co-signing nurse is able to place an eSignature**    Co-signer eSignature: Electronically signed by Yasmeen Phillips RN on 7/12/20 at 2:54 PM EDT    Does the Patient have Skin Breakdown?   No          Abdiel Prevention initiated:  Yes   Wound Care Orders initiated:  NA      [facility] nurse consulted for Pressure Injury (Stage 3,4, Unstageable, DTI, NWPT, Complex wounds)and New or Established Ostomies:  NA      Primary Nurse eSignature: Electronically signed by Wisam Valadez RN on 7/12/20 at 2:52 PM EDT 68.9

## 2020-07-12 NOTE — H&P
meetings of clubs or organizations: None     Relationship status: None    Intimate partner violence     Fear of current or ex partner: None     Emotionally abused: None     Physically abused: None     Forced sexual activity: None   Other Topics Concern    None   Social History Narrative    None     REVIEW OF SYSTEMS:   Unable to obtain. Vitals:    07/12/20 0945   BP: (!) 101/59   Pulse: 64   Resp: 14   Temp: 98.7 °F (37.1 °C)   SpO2: 97%     Gen: No distress. Alert. Eyes: PERRL. No sclera icterus. No conjunctival injection. ENT: No discharge. Pharynx clear. Neck: Trachea midline. Normal thyroid. Resp: No accessory muscle use. No crackles. No wheezes. No rhonchi. No dullness on percussion. CV: Regular rate. Regular rhythm. No murmur or rub. No edema. GI: Non-tender. Non-distended. No masses. No organomegaly. Normal bowel sounds. No hernia. Skin: Warm and dry. No nodule on exposed extremities. No rash on exposed extremities. Lymph: No cervical LAD. No supraclavicular LAD. Neuro: Awake. Non verbal,waved at me when I said bye    CBC:   Recent Labs     07/11/20  1410   WBC 6.2   HGB 14.0   HCT 40.8   MCV 86.7        BMP:   Recent Labs     07/11/20  1410      K 4.1      CO2 25   BUN 10   CREATININE 0.6*     LIVER PROFILE:   Recent Labs     07/11/20  1410   AST 13*   ALT 15   BILITOT 0.5   ALKPHOS 67     PT/INR: No results for input(s): PROTIME, INR in the last 72 hours. APTT: No results for input(s): APTT in the last 72 hours. UA:  Recent Labs     07/11/20  1418   COLORU Yellow   PHUR 7.0   CLARITYU SL CLOUDY*   SPECGRAV 1.020   LEUKOCYTESUR Negative   UROBILINOGEN 2.0*   BILIRUBINUR Negative   BLOODU Negative   GLUCOSEU Negative       Chest imaging was reviewed by me     1/2012  1.  Small focal area of extra-axial hemorrhage along the posterior right temporal lobe with very   localized mass effect on the underlying brain.  Additionally, there is a focus of mixed signal intensity located within the anterior body of the corpus callosum, as well as signal abnormality at   the midportion of the body of the corpus callosum.  These areas are felt to represent additional   foci of hemorrhage, likely shear injury.      The constellation of findings suggests recent trauma. 2.  More prominent signal intensity is evident along the course of the corticospinal tracts   bilaterally, right more prominent than left.  This is a relatively nonspecific finding, however,   this finding may be seen in the presence of neurodegenerative disease/metabolic disease.  A 3-month   follow-up examination is recommended to assess for stability and/or resolution. 3.  Normal intracranial MRA and MRV. 4.  Normal MRS centered within the left frontal white matter.      ASSESSMENT:  Active Problems:    Dysphagia  Resolved Problems:    * No resolved hospital problems. *        PLAN:  1. Dysphagia. Patient with autism and history of intracerebral bleed is admitted for G-tube placement. GI consulted. PEG tube placement will be done tomorrow. Continue patient's current group home medications. Continue IV normal saline for now as he is n.p.o.     Lovenox for DVT prophylaxis      Observation     [facility] [place] [facility] 7/12/2020 11:51 AM

## 2020-07-12 NOTE — FLOWSHEET NOTE
07/12/20 0945   Vital Signs   Temp 98.7 °F (37.1 °C)   Temp Source Oral   Pulse 64   Heart Rate Source Monitor   Resp 14   BP (!) 101/59   BP Location Left upper arm   BP Upper/Lower Upper   Patient Position Lying right side   Level of Consciousness 0   MEWS Score 0   Patient Currently in Pain No  (showing no signs of pain or facial expressions)   Oxygen Therapy   SpO2 97 %   O2 Device None (Room air)     From reading notes from Speech evaluation pt is to be STRICT Npo due to severe aspiration. All medications have been held until pt can receive the gtube which will be placed Monday. Pt is calm, pt was readjusted in his bed. Vitals were stable. Fluids remain running. Pt is sitting up and watching TV. Call light in reach with bed alarm on.

## 2020-07-12 NOTE — PROGRESS NOTES
Spoke with patients mother Mann Raymond over the phone in regards to the plan for placement of the PEG tube. Informed her that they plan to do it tomorrow. She gave consent over the phone for the procedure to be performed and second nurse Layman Doron also witnessed arjun giving consent for peg to be placed. Consent is signed and in pt chart.

## 2020-07-12 NOTE — PROGRESS NOTES
Shift assessment complete as per flowsheet. Patient is nonverbal at baseline. He occasionally will give thumbs up/down to yes/no questions. Unlabored breathing at rest on room air. See MAR for med administration. Safety measures in place and will continue to closely monitor.

## 2020-07-12 NOTE — PLAN OF CARE
Problem: Falls - Risk of:  Goal: Will remain free from falls  Description: Will remain free from falls  Outcome: Ongoing  Goal: Absence of physical injury  Description: Absence of physical injury  Outcome: Ongoing     Problem: Skin Integrity:  Goal: Will show no infection signs and symptoms  Description: Will show no infection signs and symptoms  Outcome: Ongoing  Goal: Absence of new skin breakdown  Description: Absence of new skin breakdown  Outcome: Ongoing     Problem: Infection:  Goal: Will remain free from infection  Description: Will remain free from infection  Outcome: Ongoing     Problem: Safety:  Goal: Free from accidental physical injury  Description: Free from accidental physical injury  Outcome: Ongoing  Goal: Free from intentional harm  Description: Free from intentional harm  Outcome: Ongoing     Problem: Daily Care:  Goal: Daily care needs are met  Description: Daily care needs are met  Outcome: Ongoing     Problem: Pain:  Goal: Patient's pain/discomfort is manageable  Description: Patient's pain/discomfort is manageable  Outcome: Ongoing     Problem: Skin Integrity:  Goal: Skin integrity will stabilize  Description: Skin integrity will stabilize  Outcome: Ongoing     Problem: Discharge Planning:  Goal: Patients continuum of care needs are met  Description: Patients continuum of care needs are met  Outcome: Ongoing

## 2020-07-12 NOTE — PROGRESS NOTES
EOS report and transfer of care to [facility]. Patient resting in bed, stable condition at this time.

## 2020-07-12 NOTE — PLAN OF CARE
Problem: Daily Care:  Goal: Daily care needs are met  Description: Daily care needs are met  7/12/2020 1424 by Power Smith RN  Outcome: Met This Shift  7/12/2020 0117 by Gladis Barrera  Outcome: Ongoing   Pt was given bed bath today, assisted to use urinal and pt able to urinate into urinal just have to position it every hour or so for patient  Problem: Falls - Risk of:  Goal: Will remain free from falls  Description: Will remain free from falls  7/12/2020 1424 by Power Smith RN  Outcome: Ongoing  7/12/2020 0117 by Gladis Barrrea  Outcome: Ongoing  Goal: Absence of physical injury  Description: Absence of physical injury  7/12/2020 0117 by Gladis Barrera  Outcome: Ongoing   Pt remains with bed alarm on and call light in reach  Problem: Skin Integrity:  Goal: Will show no infection signs and symptoms  Description: Will show no infection signs and symptoms  7/12/2020 1424 by Power Smith RN  Outcome: Ongoing  7/12/2020 0117 by Gladis Barrera  Outcome: Ongoing  Goal: Absence of new skin breakdown  Description: Absence of new skin breakdown  7/12/2020 0117 by Gladis Barrera  Outcome: Ongoing   Pt is being turned every 2 hours  Problem: Infection:  Goal: Will remain free from infection  Description: Will remain free from infection  7/12/2020 0117 by Gladis Barrera  Outcome: Ongoing     Problem: Safety:  Goal: Free from accidental physical injury  Description: Free from accidental physical injury  7/12/2020 0117 by Gladis Barrera  Outcome: Ongoing  Goal: Free from intentional harm  Description: Free from intentional harm  7/12/2020 0117 by Gladis Barrera  Outcome: Ongoing     Problem: Pain:  Goal: Patient's pain/discomfort is manageable  Description: Patient's pain/discomfort is manageable  7/12/2020 0117 by Gladis Barrera  Outcome: Ongoing     Problem: Skin Integrity:  Goal: Skin integrity will stabilize  Description: Skin integrity will stabilize  7/12/2020 0117 by Gladis Barrera  Outcome: Ongoing     Problem: Discharge Planning:  Goal: Patients continuum of care needs are met  Description: Patients continuum of care needs are met  7/12/2020 0117 by Dereck Anne  Outcome: Ongoing

## 2020-07-12 NOTE — CONSULTS
Gastroenterology Consult Note    Patient:   Minal Wilde   YOB: 1995   Facility:   [facility]   Referring/PCP: Elda Nelson MD  Date:     7/12/2020  Consultant:   Deborah Seals MD    Subjective: This 27 y.o. male was admitted 7/11/2020 with a diagnosis of \"Dysphagia [R13.10]\" and is seen in consultation regarding \"dysphagia\". Information was obtained from interview of  the patient, examination of the patient, and review of records. I did  update the past medical, surgical, social and / or family history. Dysphagia for weeks moderate in oropharynx assoc w FTT    Current status  Present  Diet Order: Diet NPO Effective Now and he is not tolerating diet. Recently, he has experienced no abdominal  Pain and he has not required Intravenous narcotic analgesics. The patient has also experienced no constipation, diarrhea, fever, hematochezia, melena, nausea and vomiting      Prior to Admission medications    Medication Sig Start Date End Date Taking? Authorizing Provider   Nutritional Supplements (ENSURE ACTIVE PO) Take by mouth   Yes Historical Provider, MD   phenol 1.4 % LIQD mouth spray Take 1 drop by mouth once   Yes Historical Provider, MD   albuterol sulfate HFA (PROAIR HFA) 108 (90 Base) MCG/ACT inhaler Use 2 puffs every 4 hours while awake for  PRN cough/wheezing  Dispense with SPACER and Instruct on use. May sub Ventolin or Proventil as needed per [employer].  11/14/19  Yes AMAIRANI Saenz - CNP   risperiDONE (RISPERDAL) 1 MG tablet Take 1.5 mg by mouth 3 times daily    Yes Historical Provider, MD   loperamide (ANTI-DIARRHEAL) 2 MG tablet Take 2 mg by mouth 4 times daily as needed for Diarrhea   Yes Historical Provider, MD   Benzocaine-Menthol (CHLORASEPTIC) 6-10 MG LOZG lozenge Take 1 lozenge by mouth every 2 hours as needed for Sore Throat   Yes Historical Provider, MD   Dextromethorphan-guaiFENesin  MG/5ML SYRP Take 5 mLs by mouth every 4 hours as needed for Cough   Yes Historical Provider, MD   aluminum & magnesium hydroxide-simethicone (MAALOX) 200-200-20 MG/5ML SUSP suspension Take 5 mLs by mouth every 6 hours as needed for Indigestion   Yes Historical Provider, MD   magnesium hydroxide (MILK OF MAGNESIA CONCENTRATE) 2400 MG/10ML SUSP Take 2,400 mg by mouth once as needed   Yes Historical Provider, MD   pseudoephedrine (SUDAFED 12 HR) 120 MG extended release tablet Take 120 mg by mouth every 12 hours   Yes Historical Provider, MD   tolnaftate (TINACTIN) 1 % spray Apply topically 2 times daily Apply topically 2 times daily. Yes Historical Provider, MD   traZODone (DESYREL) 100 MG tablet Take 100 mg by mouth nightly    Yes Historical Provider, MD   fluticasone (FLONASE) 50 MCG/ACT nasal spray 1 spray by Each Nare route daily   Yes Historical Provider, MD   acetaminophen (TYLENOL) 325 MG tablet Take 650 mg by mouth every 6 hours as needed for Pain   Yes Historical Provider, MD   Camphor-Eucalyptus-Menthol (VICKS VAPORUB EX) Apply topically 3 times daily as needed   Yes Historical Provider, MD   hepatitis B vaccine (ENGERIX-B) 20 MCG/ML injection Inject 20 mcg into the muscle once    Historical Provider, MD      Scheduled Medications:    risperiDONE  1.5 mg Oral TID    traZODone  200 mg Oral Nightly    sodium chloride flush  10 mL Intravenous 2 times per day    enoxaparin  40 mg Subcutaneous Nightly     Infusions:    sodium chloride 75 mL/hr at 07/12/20 0956     PRN Medications: sodium chloride flush, acetaminophen **OR** acetaminophen, polyethylene glycol, promethazine **OR** ondansetron  Allergies: Allergies   Allergen Reactions    Ziprasidone Hcl Other (See Comments)     Gets very angry       Past Medical History:   Diagnosis Date    Ataxia     Autism     Cerebral artery occlusion with cerebral infarction ([facility] [address]     Encephalopathy      History reviewed. No pertinent surgical history.     Social:   Social History     Tobacco Use    Smoking status: Never Smoker    Smokeless tobacco: Never Used   Substance Use Topics    Alcohol use: Never     Frequency: Never     Family: History reviewed. No pertinent family history. ROS: Pertinent items are noted in HPI. Objective:   Vital Signs:  Temp (24hrs), Av.2 °F (36.8 °C), Min:97.8 °F (36.6 °C), Max:98.7 °F (40.1 °C)     Systolic (33VFS), HZC:522 , Min:94 , UOX:049      Diastolic (05CXW), NWO:34, Min:59, Max:68     Pulse  Av  Min: 64  Max: 71  BP (!) 101/59   Pulse 64   Temp 98.7 °F (37.1 °C) (Oral)   Resp 14   Ht 5' 8\" (1.727 m)   Wt 138 lb 14.2 oz (63 kg)   SpO2 97%   BMI 21.12 kg/m²      Physical Exam:   BP (!) 101/59   Pulse 64   Temp 98.7 °F (37.1 °C) (Oral)   Resp 14   Ht 5' 8\" (1.727 m)   Wt 138 lb 14.2 oz (63 kg)   SpO2 97%   BMI 21.12 kg/m²   General appearance: alert, appears stated age and cooperative  Lungs: clear to auscultation bilaterally  Chest wall: no tenderness  Heart: regular rate and rhythm, S1, S2 normal, no murmur, click, rub or gallop  Abdomen: soft, non-tender; bowel sounds normal; no masses,  no organomegaly  Extremities: extremities normal, atraumatic, no cyanosis or edema  Skin: Skin color, texture, turgor normal. No rashes or lesions  Neurologic: Grossly normal    Lab and Imaging Review   Recent Labs     20  1410   WBC 6.2   HGB 14.0   MCV 86.7         K 4.1      CO2 25   BUN 10   CREATININE 0.6*   GLUCOSE 95   CALCIUM 9.8   PROT 7.1   LABALBU 4.7   AST 13*   ALT 15   ALKPHOS 67   BILITOT 0.5       Assessment:     Patient Active Problem List    Diagnosis Date Noted    Dysphagia 2020    Autism 2019     28 yo w advanced Autism admitted w dysphagia and FTT. Had failed swallow eval on . Plan:   1. Supportive care  2. PEG placement in am  3.  Will follow    Josee Denise MD       (O) [phone]

## 2020-07-12 NOTE — PROGRESS NOTES
Spoke to patient's caregiver, Amber Reece, at this time. Admission questions complete with assistance of Edwin. He plans to bring the patient's group home paperwork with him tomorrow when he visits.

## 2020-07-13 ENCOUNTER — ANESTHESIA (OUTPATIENT)
Dept: ENDOSCOPY | Age: 25
End: 2020-07-13
Payer: MEDICARE

## 2020-07-13 ENCOUNTER — ANESTHESIA EVENT (OUTPATIENT)
Dept: ENDOSCOPY | Age: 25
End: 2020-07-13
Payer: MEDICARE

## 2020-07-13 VITALS
SYSTOLIC BLOOD PRESSURE: 98 MMHG | RESPIRATION RATE: 17 BRPM | OXYGEN SATURATION: 96 % | DIASTOLIC BLOOD PRESSURE: 54 MMHG

## 2020-07-13 LAB
GLUCOSE BLD-MCNC: 171 MG/DL (ref 70–99)
GLUCOSE BLD-MCNC: 69 MG/DL (ref 70–99)
GLUCOSE BLD-MCNC: 94 MG/DL (ref 70–99)
PERFORMED ON: ABNORMAL
PERFORMED ON: ABNORMAL
PERFORMED ON: NORMAL
SARS-COV-2, NAAT: NOT DETECTED

## 2020-07-13 PROCEDURE — 3700000001 HC ADD 15 MINUTES (ANESTHESIA): Performed by: INTERNAL MEDICINE

## 2020-07-13 PROCEDURE — 96372 THER/PROPH/DIAG INJ SC/IM: CPT

## 2020-07-13 PROCEDURE — 6360000002 HC RX W HCPCS: Performed by: NURSE ANESTHETIST, CERTIFIED REGISTERED

## 2020-07-13 PROCEDURE — 3609013300 HC EGD TUBE PLACEMENT: Performed by: INTERNAL MEDICINE

## 2020-07-13 PROCEDURE — 7100000011 HC PHASE II RECOVERY - ADDTL 15 MIN: Performed by: INTERNAL MEDICINE

## 2020-07-13 PROCEDURE — G0378 HOSPITAL OBSERVATION PER HR: HCPCS

## 2020-07-13 PROCEDURE — 6360000002 HC RX W HCPCS: Performed by: INTERNAL MEDICINE

## 2020-07-13 PROCEDURE — 96374 THER/PROPH/DIAG INJ IV PUSH: CPT

## 2020-07-13 PROCEDURE — 7100000010 HC PHASE II RECOVERY - FIRST 15 MIN: Performed by: INTERNAL MEDICINE

## 2020-07-13 PROCEDURE — 2709999900 HC NON-CHARGEABLE SUPPLY: Performed by: INTERNAL MEDICINE

## 2020-07-13 PROCEDURE — 99225 PR SBSQ OBSERVATION CARE/DAY 25 MINUTES: CPT | Performed by: INTERNAL MEDICINE

## 2020-07-13 PROCEDURE — 2580000003 HC RX 258: Performed by: INTERNAL MEDICINE

## 2020-07-13 PROCEDURE — 2500000003 HC RX 250 WO HCPCS: Performed by: NURSE ANESTHETIST, CERTIFIED REGISTERED

## 2020-07-13 PROCEDURE — U0002 COVID-19 LAB TEST NON-CDC: HCPCS

## 2020-07-13 PROCEDURE — 3700000000 HC ANESTHESIA ATTENDED CARE: Performed by: INTERNAL MEDICINE

## 2020-07-13 RX ORDER — NICOTINE POLACRILEX 4 MG
15 LOZENGE BUCCAL PRN
Status: DISCONTINUED | OUTPATIENT
Start: 2020-07-13 | End: 2020-07-16 | Stop reason: HOSPADM

## 2020-07-13 RX ORDER — PROPOFOL 10 MG/ML
INJECTION, EMULSION INTRAVENOUS PRN
Status: DISCONTINUED | OUTPATIENT
Start: 2020-07-13 | End: 2020-07-13 | Stop reason: SDUPTHER

## 2020-07-13 RX ORDER — DEXTROSE MONOHYDRATE 25 G/50ML
12.5 INJECTION, SOLUTION INTRAVENOUS PRN
Status: DISCONTINUED | OUTPATIENT
Start: 2020-07-13 | End: 2020-07-16 | Stop reason: HOSPADM

## 2020-07-13 RX ORDER — CEFAZOLIN SODIUM 1 G/3ML
2 INJECTION, POWDER, FOR SOLUTION INTRAMUSCULAR; INTRAVENOUS ONCE
Status: DISCONTINUED | OUTPATIENT
Start: 2020-07-13 | End: 2020-07-13 | Stop reason: SDUPTHER

## 2020-07-13 RX ORDER — DEXTROSE MONOHYDRATE 50 MG/ML
100 INJECTION, SOLUTION INTRAVENOUS PRN
Status: DISCONTINUED | OUTPATIENT
Start: 2020-07-13 | End: 2020-07-16 | Stop reason: HOSPADM

## 2020-07-13 RX ORDER — LIDOCAINE HYDROCHLORIDE 20 MG/ML
INJECTION, SOLUTION INFILTRATION; PERINEURAL PRN
Status: DISCONTINUED | OUTPATIENT
Start: 2020-07-13 | End: 2020-07-13 | Stop reason: SDUPTHER

## 2020-07-13 RX ADMIN — SODIUM CHLORIDE: 9 INJECTION, SOLUTION INTRAVENOUS at 14:13

## 2020-07-13 RX ADMIN — DEXTROSE MONOHYDRATE 12.5 G: 25 INJECTION, SOLUTION INTRAVENOUS at 17:30

## 2020-07-13 RX ADMIN — SODIUM CHLORIDE: 9 INJECTION, SOLUTION INTRAVENOUS at 00:49

## 2020-07-13 RX ADMIN — DEXTROSE MONOHYDRATE 100 ML/HR: 50 INJECTION, SOLUTION INTRAVENOUS at 17:30

## 2020-07-13 RX ADMIN — ENOXAPARIN SODIUM 40 MG: 40 INJECTION SUBCUTANEOUS at 20:07

## 2020-07-13 RX ADMIN — Medication 2 G: at 13:51

## 2020-07-13 RX ADMIN — LIDOCAINE HYDROCHLORIDE 50 MG: 20 INJECTION, SOLUTION INFILTRATION; PERINEURAL at 14:01

## 2020-07-13 RX ADMIN — PROPOFOL 320 MG: 10 INJECTION, EMULSION INTRAVENOUS at 14:01

## 2020-07-13 ASSESSMENT — PAIN SCALES - WONG BAKER
WONGBAKER_NUMERICALRESPONSE: 0

## 2020-07-13 NOTE — PROGRESS NOTES
Pt down for PEG tube placement. Rapid covid has been sent for nursing home placement. pts mother was also spoken to early this Am about the time that patients procedure would occur.

## 2020-07-13 NOTE — PROGRESS NOTES
approximation all were partially reduced. Strategies such as a head rotation were not successful. The pt was unable to complete a chin tuck despite cues. At this time the pt is at high risk for aspiration. Recommend NPO status with alternative means of nutrition. \"    Will continue to follow and complete evaluation after PEG placement. Thank you. Kinjal MARTÍNEZ.  [id number] [facility]  Speech-Language Pathologist [phone]

## 2020-07-13 NOTE — PROGRESS NOTES
Progress Note    Admit Date:  7/11/2020    Subjective:  Mr. Eddy Carroll is scheduled for PEG today. Objective:   BP (!) 87/48   Pulse 62   Temp 98.9 °F (37.2 °C) (Oral)   Resp 14   Ht 5' 8\" (1.727 m)   Wt 138 lb 14.2 oz (63 kg)   SpO2 95%   BMI 21.12 kg/m²          Intake/Output Summary (Last 24 hours) at 7/13/2020 1103  Last data filed at 7/13/2020 1036  Gross per 24 hour   Intake 1455 ml   Output 2125 ml   Net -670 ml       Physical Exam:    General appearance: alert, appears stated age and cooperative  Head: Normocephalic, without obvious abnormality, atraumatic  Eyes: conjunctivae/corneas clear. PERRL, EOM's intact.   Neck: no adenopathy, no carotid bruit, no JVD, supple, symmetrical, trachea midline and thyroid not enlarged, symmetric, no tenderness/mass/nodules  Lungs: clear to auscultation bilaterally  Heart: regular rate and rhythm, S1, S2 normal, no murmur, click, rub or gallop  Abdomen: soft, non-tender; bowel sounds normal; no masses,  no organomegaly  Extremities: extremities normal, atraumatic, no cyanosis or edema  Pulses: 2+ and symmetric  Skin: Skin color, texture, turgor normal. No rashes or lesions      Scheduled Meds:   ceFAZolin  2 g Intramuscular Once    risperiDONE  1.5 mg Oral TID    traZODone  200 mg Oral Nightly    sodium chloride flush  10 mL Intravenous 2 times per day    enoxaparin  40 mg Subcutaneous Nightly       Continuous Infusions:   sodium chloride 75 mL/hr at 07/13/20 0049       PRN Meds:  sodium chloride flush, acetaminophen **OR** acetaminophen, polyethylene glycol, promethazine **OR** ondansetron      Data:  CBC:   Recent Labs     07/11/20  1410   WBC 6.2   HGB 14.0   HCT 40.8   MCV 86.7        BMP:   Recent Labs     07/11/20  1410      K 4.1      CO2 25   BUN 10   CREATININE 0.6*     LIVER PROFILE:   Recent Labs     07/11/20  1410   AST 13*   ALT 15   BILITOT 0.5   ALKPHOS 67     No orders to display     CT cervical spine 4/23/2020     Normal CT cervical spine. Assessment:  Active Problems:    Dysphagia    H/O intracranial hemorrhage  Resolved Problems:    * No resolved hospital problems. *      Plan:    1. Dysphagia. Patient with autism and history of intracerebral bleed is admitted for G-tube placement. GI consulted. PEG tube placement will be done today. Possible discharge after that. Continue patient's current group home medications.   Continue IV normal saline for now as he is n.p.o.     Lovenox for DVT prophylaxis    Ethan Irving MD 7/13/2020 11:03 AM

## 2020-07-13 NOTE — OP NOTE
Percutaneous Endoscopic Gastrostomy Note    Patient:   Robert Wilkinson   YOB: 1995   Facility:   [facility] [Inpatient]   Referring/PCP: Yaneth Angel MD  Procedure:   Esophagogastroduodenoscopy with placement of a percutaneous endoscopic gastrostomy tube  Date:     7/13/2020   Endoscopist:  Giovanna Fischer     Preoperative Diagnosis:  Feeding Difficulties  Postoperative Diagnosis:  Feeding Difficulties    Preprocedure medications: Two grams of Cefazolin were given. immediately prior to the procedure. Anesthesia: Propofol  Estimated blood loss: Estimated amount of blood loss is 1ml. Complications: None    Description of Procedure:  Informed consent was obtained from the patient's caregiver after explanation of the procedure including indications, description of the procedure,  benefits and possible risks and complications of the procedure, and alternatives. Questions were answered. The patient's history was reviewed and a directed physical examination was performed prior to the procedure. Patient recieved prophylactic antibiotics immediately prior to the start of the procedure and was monitored throughout the procedure with pulse oximetry and periodic assessment of vital signs. Patient was sedated as noted above. The Nursing staff and I performed a time out. With the patient in supine position with the head of the bed slightly elevated, Olympus  flexible endoscope was introduced and passed without difficulty. Visualization of the esophagus, stomach, and duodenum was performed and retroflexed view of the proximal stomach was obtained. The scope was passed to the antrum. Esophagus: normal.  Stomach: normal  No contraindication to placement of the gastrostomy tube was appreciated. The site for placement of the gastrostomy tube was identified with palpation and   transillumination of the abdomen. The abdomen, having been prepared, was draped in a sterile fashion.  Local anesthesia was provided with  5 mL of 1% Xylocaine. A nick was made in the skin with a #11 scalpel blade. Angiocath was introduced through the anterior abdominal wall. The needle was withdrawn and an insertion wire introduced. This was grasped with a snare and withdrawn through the patient's mouth with withdrawal of the endoscope. A 20-Frisian Ponsky-type gastrostomy tube was affixed to the wire and traction applied to the later. The tube was delivered through the anterior abdominal wall and a bolster placed at 3 cm. The endoscope was not reintroduced to confirm appropriate placement of the PEG. Dry sterile dressing was applied and an abdominal binder was placed. Findings:  -Successful PEG tube placement.     Recommendations: -NPO until tomorrow    Nedra Cates MD       (O) [phone]            Nedra Cates MD

## 2020-07-13 NOTE — CARE COORDINATION
Case Management Assessment  Initial Evaluation    Date/Time of Evaluation: 7/13/2020 11:03 AM  Assessment Completed by: Neto Elidas    Patient Name: Ji Matthews  YOB: 1995  Diagnosis: Dysphagia [R13.10]  Dysphagia [R13.10]  Date / Time: 7/11/2020 12:35 PM  Admission status/Date: OBS  Chart Reviewed: Yes      Patient Interviewed: No   Family Interviewed:  Yes - VM [place] Console      Hospitalization in the last 30 days:  No    Current PCP: Renate Brink MD    Financial  Medicare/Medicaid  Precert required for SNF : NO       3 night stay required - YES    ADLS  Support Systems: Friends/Neighbors, Family Members  Transportation: EMS transportation    Meal Preparation: per facility-new PEG placement this admission    Housing  Home Environment: [facility] [address]  Steps:   Plans to Return to Present Housing: PLACEMENT   Other Identified Issues: NA           Has Home O2 in place on admit:  No - 95% RA     Community Service Affiliation  Dialysis:  No    · Name:  · Location  · Dialysis Schedule:  · Phone:   · Fax: Outpatient PT/OT: No    Cancer Center: No     CHF Clinic: No     Pulmonary Rehab: No  Pain Clinic: No  Community Mental Health: No    Wound Clinic: No     COVID SCREENING: Will need C-19 screening prior to DC to SNF- ordered    The Plan for Transition of Care is related to the following treatment goals: Spoke w/ mother/LG via phone. Freedom of choice list offered. She is a  at [employer] and would prefer pt to go to that facility for STR and then possible transfer to a different group home once he completes STR. Referral called to [facility]. + bed. Will need HENS and SARBJIT. Explained Case Management role/services.

## 2020-07-13 NOTE — FLOWSHEET NOTE
07/13/20 1445   Vital Signs   Temp 97.9 °F (36.6 °C)   Temp Source Oral   Pulse 73   Heart Rate Source Monitor   Resp 18   /66   BP Location Left upper arm   BP Upper/Lower Upper   Patient Position Semi fowlers   Level of Consciousness 0   MEWS Score 1   Oxygen Therapy   SpO2 98 %   O2 Device None (Room air)     Pt has returned to floor. Peg tube has been placed. Vital stables. Pt alert and now back into his bed.

## 2020-07-13 NOTE — PROGRESS NOTES
4 Eyes Skin Assessment     The patient is being assess for   low abdiel    I agree that 2 RN's have performed a thorough Head to Toe Skin Assessment on the patient. ALL assessment sites listed below have been assessed. Areas assessed by both nurses:   [x]   Head, Face, and Ears   [x]   Shoulders, Back, and Chest, Abdomen  [x]   Arms, Elbows, and Hands   [x]   Coccyx, Sacrum, and Ischium  [x]   Legs, Feet, and Heels        Pt has newly placed peg tube to the abdomen. Coccyx is red but blanchable. Heels are pink and blanchable. Pt has small red spot under the chin and noted tiny red dried blood spot to R chin/cheek. No issues noted on the ears or head from wearing helmet. Some bruising the to R arm from old Iv    **SHARE this note so that the co-signing nurse is able to place an eSignature**    Co-signer eSignature: Electronically signed by Emanuel Dover RN on 7/13/20 at 4:40 PM EDT    Does the Pa tient have Skin Breakdown?  No- but Peg tube has been documented  Abdiel Prevention initiated:  Yes   Wound Care Orders initiated:  NA      [facility] nurse consulted for Pressure Injury (Stage 3,4, Unstageable, DTI, NWPT, Complex wounds)and New or Established Ostomies:  NA      Primary Nurse eSignature: Electronically signed by Cierra Disla RN on 7/13/20 at 4:30 PM EDT

## 2020-07-13 NOTE — H&P
Pre-sedation Assessment    History and Physical / Pre-Sedation Assessment  Patient:  Sharon Thao   :   1995     Intended Procedure: PEG placement      HPI: 26 yo w advanced Autism admitted w dysphagia and FTT. Had failed swallow eval on .     Plan:   1. Supportive care  2. PEG placement     Current Facility-Administered Medications   Medication Dose Route Frequency Provider Last Rate Last Dose    risperiDONE (RISPERDAL) tablet 1.5 mg  1.5 mg Oral TID Dior Anderson MD        traZODone (DESYREL) tablet 200 mg  200 mg Oral Nightly Dior Anderson MD        0.9 % sodium chloride infusion   Intravenous Continuous Dior Anderson MD 75 mL/hr at 20 0049      sodium chloride flush 0.9 % injection 10 mL  10 mL Intravenous 2 times per day Dior Anderson MD   10 mL at 20    sodium chloride flush 0.9 % injection 10 mL  10 mL Intravenous PRN Dior Anderson MD        acetaminophen (TYLENOL) tablet 650 mg  650 mg Oral Q6H PRN Dior Anderson MD        Or    acetaminophen (TYLENOL) suppository 650 mg  650 mg Rectal Q6H PRN Dior Anderson MD        polyethylene glycol (GLYCOLAX) packet 17 g  17 g Oral Daily PRN Dior Anderson MD        promethazine (PHENERGAN) tablet 12.5 mg  12.5 mg Oral Q6H PRN Beatriz Hall MD        Or    ondansetron (ZOFRAN) injection 4 mg  4 mg Intravenous Q6H PRN Dior Anderson MD        enoxaparin (LOVENOX) injection 40 mg  40 mg Subcutaneous Shabbir Anderson MD   Stopped at 20     Past Medical History:   Diagnosis Date    Ataxia     Autism     Cerebral artery occlusion with cerebral infarction ([facility] [address]     Encephalopathy      History reviewed. No pertinent surgical history. Nurses notes reviewed and agreed. Medications reviewed  Allergies:    Allergies   Allergen Reactions    Ziprasidone Hcl Other (See Comments)     Gets very angry           Physical Exam:  Vital Signs: BP (!) 109/59   Pulse 65   Temp 99.1 °F (37.3 °C) (Temporal)   Resp 18   Ht 5' 8\" (1.727 m)   Wt 138 lb 14.2 oz (63 kg)   SpO2 98%   BMI 21.12 kg/m²  Body mass index is 21.12 kg/m². Airway:Normal  Cardiac:Normal  Pulmonary:Normal  Abdomen:Normal  Specific to procedure: none      Pre-Procedure Assessment/Plan:  ASA 3 - Patient with moderate systemic disease with functional limitations  Malampatti II  Level of Sedation Plan:Deep sedation    Post Procedure plan: Return to same level of care    I assessed the patient and find that the patient is in satisfactory condition to proceed with the planned procedure and sedation plan. I have explained the risk, benefits, and alternatives to the procedure. The patient's POA understands and agrees to proceed.   Yes    Devora Hatchet, MD       (O) [phone]          Devora Hatchet  1:56 PM 7/13/2020

## 2020-07-13 NOTE — ANESTHESIA POSTPROCEDURE EVALUATION
Department of Anesthesiology  Postprocedure Note    Patient: Keke Babcock  MRN: [id number]  YOB: 1995  Date of evaluation: 7/13/2020  Time:  2:57 PM     Procedure Summary     Date:  07/13/20 Room / Location:  [address] [place] [address]    Anesthesia Start:  8511 Anesthesia Stop:  0334    Procedure:  EGD/PEG W/ANES. (N/A ) Diagnosis:  (?)    Surgeon:  Ben Chapman MD Responsible Provider:  Chika Cota MD    Anesthesia Type:  general ASA Status:  3          Anesthesia Type: general    Castro Phase I: Castro Score: 10    Castro Phase II: Castro Score: 9    Last vitals: Reviewed and per EMR flowsheets.        Anesthesia Post Evaluation    Comments: Postoperative Anesthesia Note    Name:    Keke Babcock  MRN:      [id number]    Patient Vitals in the past 12 hrs:  07/13/20 1445, BP:117/66, Temp:97.9 °F (36.6 °C), Temp src:Oral, Pulse:73, Resp:18, SpO2:98 %  07/13/20 1441, BP:105/64, Pulse:63, Resp:16, SpO2:100 %  07/13/20 1433, BP:104/61, Pulse:64, Resp:18, SpO2:99 %  07/13/20 1419, BP:(!) 99/56, Temp:97.5 °F (36.4 °C), Temp src:Temporal, Pulse:61, Resp:16, SpO2:98 %  07/13/20 1228, BP:(!) 109/59, Temp:99.1 °F (37.3 °C), Temp src:Temporal, Pulse:65, Resp:18, SpO2:98 %  07/13/20 0745, BP:(!) 87/48, Temp:98.9 °F (37.2 °C), Temp src:Oral, Pulse:62, Resp:14, SpO2:95 %  07/13/20 0335, BP:104/62, Temp:98.3 °F (36.8 °C), Temp src:Axillary, Pulse:62, Resp:16, SpO2:98 %     LABS:    CBC  Lab Results       Component                Value               Date/Time                  WBC                      6.2                 07/11/2020 02:10 PM        HGB                      14.0                07/11/2020 02:10 PM        HCT                      40.8                07/11/2020 02:10 PM        PLT                      163                 07/11/2020 02:10 PM   RENAL  Lab Results       Component                Value               Date/Time                  NA                       139 07/11/2020 02:10 PM        K                        4.1                 07/11/2020 02:10 PM        CL                       101                 07/11/2020 02:10 PM        CO2                      25                  07/11/2020 02:10 PM        BUN                      10                  07/11/2020 02:10 PM        CREATININE               0.6 (L)             07/11/2020 02:10 PM        GLUCOSE                  95                  07/11/2020 02:10 PM   COAGS  No results found for: PROTIME, INR, APTT    Intake & Output:  @33NOUM@    Nausea & Vomiting:  No    Level of Consciousness:  Awake    Pain Assessment:  Adequate analgesia    Anesthesia Complications:  No apparent anesthetic complications    SUMMARY      Vital signs stable  OK to discharge from Stage I post anesthesia care.   Care transferred from Anesthesiology department on discharge from perioperative area

## 2020-07-13 NOTE — ANESTHESIA PRE PROCEDURE
Department of Anesthesiology  Preprocedure Note       Name:  Mitali May   Age:  27 y.o.  :  1995                                          MRN:  [id number]         Date:  2020      Surgeon: Nickolas Burr):  Kaye Mckeon MD    Procedure: Procedure(s):  EGD/PEG W/ANES. Medications prior to admission:   Prior to Admission medications    Medication Sig Start Date End Date Taking? Authorizing Provider   Nutritional Supplements (ENSURE ACTIVE PO) Take by mouth   Yes Historical Provider, MD   phenol 1.4 % LIQD mouth spray Take 1 drop by mouth once   Yes Historical Provider, MD   albuterol sulfate HFA (PROAIR HFA) 108 (90 Base) MCG/ACT inhaler Use 2 puffs every 4 hours while awake for  PRN cough/wheezing  Dispense with SPACER and Instruct on use. May sub Ventolin or Proventil as needed per [employer]. 19  Yes AMAIRANI Galeas - CNP   risperiDONE (RISPERDAL) 1 MG tablet Take 1.5 mg by mouth 3 times daily    Yes Historical Provider, MD   loperamide (ANTI-DIARRHEAL) 2 MG tablet Take 2 mg by mouth 4 times daily as needed for Diarrhea   Yes Historical Provider, MD   Benzocaine-Menthol (CHLORASEPTIC) 6-10 MG LOZG lozenge Take 1 lozenge by mouth every 2 hours as needed for Sore Throat   Yes Historical Provider, MD   Dextromethorphan-guaiFENesin  MG/5ML SYRP Take 5 mLs by mouth every 4 hours as needed for Cough   Yes Historical Provider, MD   aluminum & magnesium hydroxide-simethicone (MAALOX) 200-200-20 MG/5ML SUSP suspension Take 5 mLs by mouth every 6 hours as needed for Indigestion   Yes Historical Provider, MD   magnesium hydroxide (MILK OF MAGNESIA CONCENTRATE) 2400 MG/10ML SUSP Take 2,400 mg by mouth once as needed   Yes Historical Provider, MD   pseudoephedrine (SUDAFED 12 HR) 120 MG extended release tablet Take 120 mg by mouth every 12 hours   Yes Historical Provider, MD   tolnaftate (TINACTIN) 1 % spray Apply topically 2 times daily Apply topically 2 times daily.    Yes Historical Provider, 27 07/11/2020    BUN 10 07/11/2020    CREATININE 0.6 07/11/2020    GFRAA >60 07/11/2020    AGRATIO 2.0 07/11/2020    LABGLOM >60 07/11/2020    GLUCOSE 95 07/11/2020    PROT 7.1 07/11/2020    CALCIUM 9.8 07/11/2020    BILITOT 0.5 07/11/2020    ALKPHOS 67 07/11/2020    AST 13 07/11/2020    ALT 15 07/11/2020       POC Tests: No results for input(s): POCGLU, POCNA, POCK, POCCL, POCBUN, POCHEMO, POCHCT in the last 72 hours. Coags: No results found for: PROTIME, INR, APTT    HCG (If Applicable): No results found for: PREGTESTUR, PREGSERUM, HCG, HCGQUANT     ABGs: No results found for: PHART, PO2ART, SIZ2AYV, RJD3RSI, BEART, J8EIOOGQ     Type & Screen (If Applicable):  No results found for: LABABO, LABRH    Drug/Infectious Status (If Applicable):  No results found for: HIV, HEPCAB    COVID-19 Screening (If Applicable):   Lab Results   Component Value Date    COVID19 Not Detected 07/13/2020         Anesthesia Evaluation  Patient summary reviewed and Nursing notes reviewed no history of anesthetic complications:   Airway: Mallampati: II     Neck ROM: full   Dental:          Pulmonary:Negative Pulmonary ROS and normal exam                               Cardiovascular:Negative CV ROS                      Neuro/Psych:   Negative Neuro/Psych ROS  (+) CVA:, psychiatric history:             ROS comment: Autism GI/Hepatic/Renal: Neg GI/Hepatic/Renal ROS       (-) hiatal hernia and GERD       Endo/Other: Negative Endo/Other ROS                    Abdominal:           Vascular:                                        Anesthesia Plan      general     ASA 3     (PIV, general anesthesia, IV Narcotics, PACU)  Induction: intravenous.                           Michelle Roberts MD   7/13/2020

## 2020-07-13 NOTE — PLAN OF CARE
Problem: Falls - Risk of:  Goal: Will remain free from falls  Description: Will remain free from falls  7/12/2020 2345 by Shlomo Strickland  Outcome: Ongoing  7/12/2020 1424 by Consuelo Thompson RN  Outcome: Ongoing  Goal: Absence of physical injury  Description: Absence of physical injury  Outcome: Ongoing     Problem: Skin Integrity:  Goal: Will show no infection signs and symptoms  Description: Will show no infection signs and symptoms  7/12/2020 2345 by Shlomo Strickland  Outcome: Ongoing  7/12/2020 1424 by Consuelo Thompson RN  Outcome: Ongoing  Goal: Absence of new skin breakdown  Description: Absence of new skin breakdown  Outcome: Ongoing     Problem: Infection:  Goal: Will remain free from infection  Description: Will remain free from infection  Outcome: Ongoing     Problem: Safety:  Goal: Free from accidental physical injury  Description: Free from accidental physical injury  Outcome: Ongoing  Goal: Free from intentional harm  Description: Free from intentional harm  Outcome: Ongoing     Problem: Daily Care:  Goal: Daily care needs are met  Description: Daily care needs are met  7/12/2020 2345 by Shlomo Strickland  Outcome: Ongoing  7/12/2020 1424 by Consuelo Thompson RN  Outcome: Met This Shift     Problem: Pain:  Goal: Patient's pain/discomfort is manageable  Description: Patient's pain/discomfort is manageable  Outcome: Ongoing     Problem: Skin Integrity:  Goal: Skin integrity will stabilize  Description: Skin integrity will stabilize  Outcome: Ongoing     Problem: Discharge Planning:  Goal: Patients continuum of care needs are met  Description: Patients continuum of care needs are met  Outcome: Ongoing

## 2020-07-13 NOTE — PROGRESS NOTES
Shift assessment complete as per flowsheet. VSS. He is calm, with unlabored breathing at rest on room air. No signs of distress at this time. He is nonverbal, will occasionally give thumbs up/down to yes/no questions, but otherwise unable to assess orientation. He does smile when staff says his name. IVF infusing to Rt wrist PIV without issues. Safety measures in place and will continue to closely monitor.

## 2020-07-13 NOTE — FLOWSHEET NOTE
07/13/20 0745   Vital Signs   Temp 98.9 °F (37.2 °C)   Temp Source Oral   Pulse 62   Heart Rate Source Monitor   Resp 14   BP (!) 87/48   BP Location Left upper arm   BP Upper/Lower Upper   Patient Position Lying right side   Level of Consciousness 0   MEWS Score 1   Patient Currently in Pain No  (no visual signs of pain)   Oxygen Therapy   SpO2 95 %   O2 Device None (Room air)     Plan remains for PEG tube placement at some point today. That consent is signed and in patient chart from yesterday. Helmet remains on pt this morning due to him banging his head on side rails, side rails have also been padded. Pt remains with Iv fluids running. pts mother is aware of procedure occurring today. Pt remains strict Npo so no medications were given. Call light in reach with bed alarm on.

## 2020-07-13 NOTE — PROGRESS NOTES
pts Bg was checked due to being Npo since admission and remaining NPO till tomorrow. BG was 69 and noted no orders for hypoglycemia protocol. Reached out to dr. Catherine Bernard in regards to this to have orders placed for hypoglycemia protocol so that patient can receive some dextrose.

## 2020-07-13 NOTE — PROGRESS NOTES
Hypoglycemia protocol ordered per dr Bentley Ashley. Pt was given D50 as ordered and then started on d5 at 100 an hour as ordered per protocol.

## 2020-07-14 LAB
GLUCOSE BLD-MCNC: 78 MG/DL (ref 70–99)
GLUCOSE BLD-MCNC: 81 MG/DL (ref 70–99)
GLUCOSE BLD-MCNC: 81 MG/DL (ref 70–99)
GLUCOSE BLD-MCNC: 93 MG/DL (ref 70–99)
PERFORMED ON: NORMAL

## 2020-07-14 PROCEDURE — G0378 HOSPITAL OBSERVATION PER HR: HCPCS

## 2020-07-14 PROCEDURE — 96372 THER/PROPH/DIAG INJ SC/IM: CPT

## 2020-07-14 PROCEDURE — 99224 PR SBSQ OBSERVATION CARE/DAY 15 MINUTES: CPT | Performed by: INTERNAL MEDICINE

## 2020-07-14 PROCEDURE — 2580000003 HC RX 258: Performed by: INTERNAL MEDICINE

## 2020-07-14 PROCEDURE — 6370000000 HC RX 637 (ALT 250 FOR IP): Performed by: INTERNAL MEDICINE

## 2020-07-14 RX ADMIN — Medication 10 ML: at 21:30

## 2020-07-14 RX ADMIN — ENOXAPARIN SODIUM 40 MG: 40 INJECTION SUBCUTANEOUS at 21:30

## 2020-07-14 RX ADMIN — RISPERIDONE 1.5 MG: 1 TABLET ORAL at 13:11

## 2020-07-14 RX ADMIN — SODIUM CHLORIDE: 9 INJECTION, SOLUTION INTRAVENOUS at 15:35

## 2020-07-14 RX ADMIN — TRAZODONE HYDROCHLORIDE 200 MG: 100 TABLET ORAL at 21:30

## 2020-07-14 RX ADMIN — RISPERIDONE 1.5 MG: 1 TABLET ORAL at 21:30

## 2020-07-14 RX ADMIN — SODIUM CHLORIDE: 9 INJECTION, SOLUTION INTRAVENOUS at 21:35

## 2020-07-14 ASSESSMENT — PAIN SCALES - WONG BAKER: WONGBAKER_NUMERICALRESPONSE: 0

## 2020-07-14 NOTE — PROGRESS NOTES
Pt tolerating tube feeding well, no signs of discomfort noted. Tube feeding rate increased at this time per order by 20mL an hour. New rate 45mL/hr.

## 2020-07-14 NOTE — PROGRESS NOTES
Deven Rosales is a 27 y.o. male patient. Current Facility-Administered Medications   Medication Dose Route Frequency Provider Last Rate Last Dose    glucose (GLUTOSE) 40 % oral gel 15 g  15 g Oral PRN Lucio Santacruz MD        dextrose 50 % IV solution  12.5 g Intravenous PRN Lucio Santacruz MD   12.5 g at 07/13/20 1730    glucagon (rDNA) injection 1 mg  1 mg Intramuscular PRN Lucio Santacruz MD        dextrose 5 % solution  100 mL/hr Intravenous PRN Lucio Santacruz MD   Stopped at 07/14/20 0228    risperiDONE (RISPERDAL) tablet 1.5 mg  1.5 mg Oral TID Nica Reynoso MD   Stopped at 07/14/20 1005    traZODone (DESYREL) tablet 200 mg  200 mg Oral Nightly Nica Reynoso MD        0.9 % sodium chloride infusion   Intravenous Continuous Nica Reynoso MD 75 mL/hr at 07/13/20 0049      sodium chloride flush 0.9 % injection 10 mL  10 mL Intravenous 2 times per day Nica Reynoso MD   10 mL at 07/11/20 2206    sodium chloride flush 0.9 % injection 10 mL  10 mL Intravenous PRN Nica Reynoso MD        acetaminophen (TYLENOL) tablet 650 mg  650 mg Oral Q6H PRN Nica Reynoso MD        Or    acetaminophen (TYLENOL) suppository 650 mg  650 mg Rectal Q6H PRN Nica Reynoso MD        polyethylene glycol (GLYCOLAX) packet 17 g  17 g Oral Daily PRN Nica Reynoso MD        promethazine (PHENERGAN) tablet 12.5 mg  12.5 mg Oral Q6H PRN Nica Reynoso MD        Or    ondansetron (ZOFRAN) injection 4 mg  4 mg Intravenous Q6H PRN Nica Reynoso MD        enoxaparin (LOVENOX) injection 40 mg  40 mg Subcutaneous Nightly Nica Reynoso MD   40 mg at 07/13/20 2007     Allergies   Allergen Reactions    Ziprasidone Hcl Other (See Comments)     Gets very angry     Active Problems:    Dysphagia    H/O intracranial hemorrhage  Resolved Problems:    * No resolved hospital problems.  *    Blood pressure 107/66, pulse 66, temperature 98.1 °F (36.7 °C), temperature source Oral, resp. rate 18, height 5' 8\" (1.727 m), weight 138 lb 14.2 oz (63 kg), SpO2 98 %. Subjective:  Symptoms:  Stable. Pain:  He reports no pain. Objective:  General Appearance: In no acute distress and not in pain. Vital signs: (most recent): Blood pressure 107/66, pulse 66, temperature 98.1 °F (36.7 °C), temperature source Oral, resp. rate 18, height 5' 8\" (1.727 m), weight 138 lb 14.2 oz (63 kg), SpO2 98 %. Heart: Normal rate. Regular rhythm. S1 normal and S2 normal.    Abdomen: Abdomen is soft. Bowel sounds are normal.   There is no abdominal tenderness. (PEG site is clear). Assessment & Plan  26 yo w advanced Autism admitted w dysphagia and FTT. Had failed swallow eval on 7/9. PEG placed 7/13.     Plan:   1. Change PEG dressing  2.  OK to use PEG  3. OK to D/C today     Ike Spurling, MD       (O) [phone]    Ike Spurling, MD  7/14/2020

## 2020-07-14 NOTE — DISCHARGE INSTR - DIET
1. Recommend order \"Diet: Tube feed continuous/ NPO\". Initiate Jevity 1.2 (\"standard with fiber\" formula) at 25 mL/hr and as tolerated, increase by 20 mL/hr q 4 hours until goal of 75 mL/hr is met via PEG. 2. Recommend 60 mL H20 q 3 hours. Recommend reevaluate IV fluids at this time. Increase flush if Na increases greater than 145 mEq/L.  3. Monitor closely and correct lytes (K, Phos, Mg) before progressing TF to goal d/t risk of refeeding syndrome (hx extended inadequate nutritional intake). 4. Ensure head of bed is 30 - 45 degrees during continuous or bolus gastric feeding and for one hour after bolus. Turn off the feeding if head of bed is lowered less than 30 degrees. 5. Monitor for s/s of intolerance (residuals greater than 500 mL, bowel habits, N/V, cramping). 6. Irrigate tube with 30 mL water before, between and after each medication. 7. If tube becomes clogged, first try flushing with water. If water does not unclog the tube, may use clog zapper at the direction of the physician/LIP. If tube remains clogged, contact Physician/LIP for additional orders. 8. Closed System Guidelines:  Change tubing and feeding container every 24 hours. 9. Open System Feeding Guidelines:  Fill bag with reconstituted or diluted formula for a maximum of 4 hours; canned full strength formula for maximum of 8 hours. Allow bag to empty completely before adding additional formula. Any unused portion of formula in an open can is to be discarded immediately.     Thank you, [id number] [facility],[address], [address] [facility]

## 2020-07-14 NOTE — DISCHARGE INSTR - COC
NOT a DME order):  wheelchair  Other Treatments: n/a    Patient's personal belongings (please select all that are sent with patient):  None    RN SIGNATURE:  Electronically signed by Thea Sinha RN on 7/16/20 at 3:26 PM EDT    CASE MANAGEMENT/SOCIAL WORK SECTION    Inpatient Status Date: observation    Readmission Risk Assessment Score:  Readmission Risk              Risk of Unplanned Readmission:        12           Discharging to Facility/ Agency   · Name: [facility]  · Address: [place] [address]  · [address] [place] [address]  · Fax: [phone]    Dialysis Facility (if applicable)   · Name:  · Address:  · Dialysis Schedule:  · Phone:  · Fax:    / signature: Electronically signed by Rosio Eldridge RN on 7/16/20 at 11:24 AM EDT    PHYSICIAN SECTION    Prognosis: Fair    Condition at Discharge: Stable    Rehab Potential (if transferring to Rehab): Poor    Recommended Labs or Other Treatments After Discharge: Tube feeds    Physician Certification: I certify the above information and transfer of Jacky Calle  is necessary for the continuing treatment of the diagnosis listed and that he requires [facility] for less 30 days.      Update Admission H&P: No change in H&P    PHYSICIAN SIGNATURE:  Electronically signed by Andre Balderas MD on 7/14/20 at 10:22 AM EDT

## 2020-07-14 NOTE — CARE COORDINATION
DC order noted. DC delayed. Await new TF orders and titration to goal rate today prior to setting up transportation. D/W Callie Zayas. 15:00 TC from admissions at [facility] requesting LOC as pt will be admitted under Medicaid benefit. Dr. Rhonda Vera notified of delay in DC and DC order removed. PASRR was completed and triggered [id number] [address] of  evaluation due to prior HX of TBI. Will needs determination before LOC can be submitted to [facility].  +CM following

## 2020-07-14 NOTE — FLOWSHEET NOTE
07/13/20 1927   Vital Signs   Temp 97.9 °F (36.6 °C)   Temp Source Oral   Pulse 83   Heart Rate Source Monitor   Resp 18   /66   BP Location Right upper arm   BP Upper/Lower Upper   Patient Position Semi fowlers   Level of Consciousness 0   MEWS Score 1   Oxygen Therapy   SpO2 96 %   O2 Device None (Room air)     Pt received his lovenox this evening. Per order that stated to hold lovenox yesterday it is okay to resume lovenox after the peg tube has been placed. Pt also had helmet removed and hair washed. Urinal was offered. Pt urinated. Helmet was reapplied to patient due to patient occasional outburst of trying to bang his head. Bed railing remain padded. Call light in reach. Bed alarm on.

## 2020-07-14 NOTE — PROGRESS NOTES
Pt tolerating tube feeding well, no signs of distress or discomfort noted at this time. RN will monitor.

## 2020-07-14 NOTE — PROGRESS NOTES
Speech Language Pathology      The pt is known by Speech Therapy due to the pt having an MBS on 7/9/20. This MBS revealed severe oropharyngeal dysphagia with significant pharyngeal residue and subsequent aspiration after the swallow. NPO status was recommended with alternative means of nutrition and hydration. The pt has had a PEG placed. At this time considering the severity of the pt's oropharyngeal dysphagia he will need more aggressive dysphagia treatment. He is reportedly going to be scheduled for outpatient dysphagia treatment in the future with attempted use of neuromuscular electrical stimulation (VitalStim) to improved swallowing function. Considering the severity of the pt's dysphagia and expected shorter length of stay it is recommended to hold dysphagia services at this time until more ongoing dysphagia services can be initiated such as through outpatient services.     [place], [place], [address] [facility]  [id number]  Speech-Language Pathologist  Phone: [phone]  Fax: [phone]

## 2020-07-14 NOTE — PROGRESS NOTES
Progress Note    Admit Date:  7/11/2020    Subjective:  Mr. Florence Wen had PEG placed      Objective:   /60   Pulse 82   Temp 98.5 °F (36.9 °C) (Oral)   Resp 16   Ht 5' 8\" (1.727 m)   Wt 138 lb 14.2 oz (63 kg)   SpO2 97%   BMI 21.12 kg/m²          Intake/Output Summary (Last 24 hours) at 7/14/2020 1541  Last data filed at 7/14/2020 1439  Gross per 24 hour   Intake 0 ml   Output 2025 ml   Net -2025 ml       Physical Exam:    General appearance: alert, appears stated age and cooperative  Head: Normocephalic, without obvious abnormality, atraumatic  Eyes: conjunctivae/corneas clear. PERRL, EOM's intact. Neck: no adenopathy, no carotid bruit, no JVD, supple, symmetrical, trachea midline and thyroid not enlarged, symmetric, no tenderness/mass/nodules  Lungs: clear to auscultation bilaterally  Heart: regular rate and rhythm, S1, S2 normal, no murmur, click, rub or gallop  Abdomen: soft, non-tender; bowel sounds normal; no masses,  no organomegaly  Extremities: extremities normal, atraumatic, no cyanosis or edema  Pulses: 2+ and symmetric  Skin: Skin color, texture, turgor normal. No rashes or lesions      Scheduled Meds:   risperiDONE  1.5 mg Oral TID    traZODone  200 mg Oral Nightly    sodium chloride flush  10 mL Intravenous 2 times per day    enoxaparin  40 mg Subcutaneous Nightly       Continuous Infusions:   dextrose Stopped (07/14/20 0228)    sodium chloride 75 mL/hr at 07/14/20 1535       PRN Meds:  glucose, dextrose, glucagon (rDNA), dextrose, sodium chloride flush, acetaminophen **OR** acetaminophen, polyethylene glycol, promethazine **OR** ondansetron      Data:  CBC:   No results for input(s): WBC, HGB, HCT, MCV, PLT in the last 72 hours. BMP:   No results for input(s): NA, K, CL, CO2, PHOS, BUN, CREATININE in the last 72 hours. Invalid input(s): CA  LIVER PROFILE:   No results for input(s): AST, ALT, LIPASE, BILIDIR, BILITOT, ALKPHOS in the last 72 hours. Invalid input(s):   AMYLASE,

## 2020-07-14 NOTE — PROGRESS NOTES
Care taken over from [facility]. Pt resting. Resp e/e. Shift assessment completed and charted. No needs. Will monitor.  Harrison Lopez

## 2020-07-14 NOTE — DISCHARGE SUMMARY
Name:  Mauricio Course  Room:  0212/0212-01  MRN:    [id number]    Discharge Summary      This discharge summary is in conjunction with a complete physical exam done on the day of discharge. Attending Physician: Dr. Iliana Banegas  Discharging Physician: Dr. Alyssa Vega: 7/11/2020  Discharge:   7/16/2020    HPI:  A  28-year-old male with a history of autism and  a history of intracranial bleed likely secondary to trauma in 2012, currently living in a group home, underwent swallowing evaluation for dysphagia and was advised to have a G-tube placed.     Patient is allergic to ziprasidone hcl. Diagnoses this Admission and Hospital Course   1.  Dysphagia. Patient with autism and history of intracerebral bleed is admitted for G-tube placement. [address] consulted. PEG tube placed. Tube feedings started. Continued patient's current group home medications. Given IV normal saline when he was n.p.o.     Lovenox for DVT prophylaxis    Procedures (Please Review Full Report for Details)  Modified barium swallow  Esophagogastroduodenoscopy with placement of a percutaneous endoscopic gastrostomy tube      Consults    GI      Physical Exam at Discharge:    BP (!) 98/52   Pulse 70   Temp 96.7 °F (35.9 °C) (Oral)   Resp 16   Ht 5' 8\" (1.727 m)   Wt 138 lb 14.2 oz (63 kg)   SpO2 98%   BMI 21.12 kg/m²     General appearance: wears a helmet. Does not respond to commands. Eyes open. Non verbal  Head: Normocephalic, without obvious abnormality, atraumatic  Eyes: conjunctivae/corneas clear. PERRL, EOM's intact. Neck: no adenopathy, no carotid bruit, no JVD, supple, symmetrical, trachea midline and thyroid not enlarged, symmetric, no tenderness/mass/nodules  Lungs: clear to auscultation bilaterally  Heart: regular rate and rhythm, S1, S2 normal, no murmur, click, rub or gallop  Abdomen: soft, non-tender; bowel sounds normal; no masses,  no organomegaly, G tube present.   Extremities: extremities normal, atraumatic, no cyanosis or edema  Pulses: 2+ and symmetric  Skin: Skin color, texture, turgor normal. No rashes or lesions    CBC:   Recent Labs     07/11/20  1410   WBC 6.2   HGB 14.0   HCT 40.8   MCV 86.7        BMP:   Recent Labs     07/11/20  1410      K 4.1      CO2 25   BUN 10   CREATININE 0.6*     LIVER PROFILE:   Recent Labs     07/11/20  1410   AST 13*   ALT 15   BILITOT 0.5   ALKPHOS 67     UA:  Recent Labs     07/11/20  1418   COLORU Yellow   PHUR 7.0   CLARITYU SL CLOUDY*   SPECGRAV 1.020   LEUKOCYTESUR Negative   UROBILINOGEN 2.0*   BILIRUBINUR Negative   BLOODU Negative   GLUCOSEU Negative       CULTURES  COVID: not detected    RADIOLOGY  CT cervical spine 4/23/2020      Normal CT cervical spine. Discharge Medications     Medication List      CONTINUE taking these medications    acetaminophen 325 MG tablet  Commonly known as:  TYLENOL     albuterol sulfate  (90 Base) MCG/ACT inhaler  Commonly known as:  ProAir HFA  Use 2 puffs every 4 hours while awake for  PRN cough/wheezing  Dispense with SPACER and Instruct on use. May sub Ventolin or Proventil as needed per [employer].      aluminum & magnesium hydroxide-simethicone 200-200-20 MG/5ML Susp suspension  Commonly known as:  MAALOX     Anti-Diarrheal 2 MG tablet  Generic drug:  loperamide     Chloraseptic 6-10 MG Lozg lozenge  Generic drug:  Benzocaine-Menthol     Dextromethorphan-guaiFENesin  MG/5ML Syrp     Engerix-B 20 MCG/ML injection  Generic drug:  hepatitis B vaccine     ENSURE ACTIVE PO     fluticasone 50 MCG/ACT nasal spray  Commonly known as:  FLONASE     magnesium hydroxide 2400 MG/10ML Susp  Commonly known as:  MILK OF MAGNESIA CONCENTRATE     phenol 1.4 % Liqd mouth spray     pseudoephedrine 120 MG extended release tablet  Commonly known as:  SUDAFED 12 HR     risperiDONE 1 MG tablet  Commonly known as:  RISPERDAL     tolnaftate 1 % spray  Commonly known as:  TINACTIN     traZODone 100 MG tablet  Commonly known as: DESYREL     VICKS VAPORUB EX              Discharged in stable condition to STR. Follow Up: Follow up with PCP.       More than 30 minutes spent      Donald Willson 7/18/2020 5:28 PM

## 2020-07-14 NOTE — PROGRESS NOTES
RN called and notified Jeremy Locus of consult and plan for discharge. Misael Temple states that she would evaluate Pt ASAP.

## 2020-07-15 PROBLEM — E44.1 MILD PROTEIN-CALORIE MALNUTRITION (HCC): Status: ACTIVE | Noted: 2020-07-15

## 2020-07-15 LAB
GLUCOSE BLD-MCNC: 105 MG/DL (ref 70–99)
GLUCOSE BLD-MCNC: 106 MG/DL (ref 70–99)
GLUCOSE BLD-MCNC: 120 MG/DL (ref 70–99)
GLUCOSE BLD-MCNC: 135 MG/DL (ref 70–99)
GLUCOSE BLD-MCNC: 96 MG/DL (ref 70–99)
PERFORMED ON: ABNORMAL
PERFORMED ON: NORMAL

## 2020-07-15 PROCEDURE — 6370000000 HC RX 637 (ALT 250 FOR IP): Performed by: INTERNAL MEDICINE

## 2020-07-15 PROCEDURE — 6360000002 HC RX W HCPCS: Performed by: INTERNAL MEDICINE

## 2020-07-15 PROCEDURE — 97166 OT EVAL MOD COMPLEX 45 MIN: CPT

## 2020-07-15 PROCEDURE — 97530 THERAPEUTIC ACTIVITIES: CPT

## 2020-07-15 PROCEDURE — 99224 PR SBSQ OBSERVATION CARE/DAY 15 MINUTES: CPT | Performed by: INTERNAL MEDICINE

## 2020-07-15 PROCEDURE — 97162 PT EVAL MOD COMPLEX 30 MIN: CPT

## 2020-07-15 PROCEDURE — G0378 HOSPITAL OBSERVATION PER HR: HCPCS

## 2020-07-15 PROCEDURE — 6370000000 HC RX 637 (ALT 250 FOR IP): Performed by: NURSE PRACTITIONER

## 2020-07-15 PROCEDURE — 2580000003 HC RX 258: Performed by: INTERNAL MEDICINE

## 2020-07-15 PROCEDURE — 96372 THER/PROPH/DIAG INJ SC/IM: CPT

## 2020-07-15 RX ORDER — TRAZODONE HYDROCHLORIDE 100 MG/1
100 TABLET ORAL NIGHTLY
Status: DISCONTINUED | OUTPATIENT
Start: 2020-07-15 | End: 2020-07-16 | Stop reason: HOSPADM

## 2020-07-15 RX ADMIN — RISPERIDONE 1.5 MG: 1 TABLET ORAL at 09:37

## 2020-07-15 RX ADMIN — Medication 10 ML: at 20:12

## 2020-07-15 RX ADMIN — TRAZODONE HYDROCHLORIDE 100 MG: 100 TABLET ORAL at 20:13

## 2020-07-15 RX ADMIN — ENOXAPARIN SODIUM 40 MG: 40 INJECTION SUBCUTANEOUS at 20:14

## 2020-07-15 RX ADMIN — SODIUM CHLORIDE: 9 INJECTION, SOLUTION INTRAVENOUS at 20:12

## 2020-07-15 ASSESSMENT — PAIN SCALES - WONG BAKER: WONGBAKER_NUMERICALRESPONSE: 0

## 2020-07-15 NOTE — PROGRESS NOTES
Inpatient Physical Therapy Evaluation and Treatment    Unit: [address]  Date:  7/15/2020  Patient Name:    Gita Day  Admitting diagnosis:  Dysphagia [R13.10]  Dysphagia [R13.10]  Admit Date:  7/11/2020  Precautions/Restrictions/WB Status/ Lines/ Wounds/ Oxygen: Fall risk, Bed/chair alarm, Lines -IV and Peg Tube and dx of Autism, hx TIA, non-verbal, abdominal binder, monitor vitals    Treatment Time:  13:20-13:50  Treatment Number:  1   Timed Code Treatment Minutes: 5 minutes (10 minute Eval + 15 minutes split with OT due to pt observation status)  Total Treatment Minutes:  30  minutes    Patient Goals for Therapy: pt is non-verbal and doesn't state goal, however pt's mother's goal his for him to go to rehab and then possibly a different group home        Discharge Recommendations: SNF  DME needs for discharge: defer to facility       Therapy recommendation for EMS Transport: requires transport by cot due to difficulty with transfers    Therapy recommendations for staff:   Assist of 2 with use of MAXI-Move for all transfers to/from chair    History of Present Illness: 26 yo male admitted to [facility] on 7/12/20 with hx of autism and hx of intracranial bleed 2/2 trauma in 2012. Pt is from a group home. Failed swallow evaluation for dysphagia on 7/9. Pt's mother is his legal guardian. She would like STR and possible transition to a different group home. 7/13 - PEG tube placed    Home Health S4 Level Recommendation:  NA  AM-PAC Mobility Score    AM-PAC Inpatient Mobility Raw Score : 11       Preadmission Environment  Attempted to contact group home to establish prior level of function, unable to reach them despite multiple attempts and no chance to leave voicemail. All information was collected from pt's mother (legal guardian).    Pt. Lives 24/7 Assist Available   Home environment:  Group Home  Steps to enter first floor: unknown  Bathroom: assisted by staff, will use urinal if held for him  Equipment owned: bambi (manual, custom)    Preadmission Status:  Pt. Able to drive: No  Pt Fully independent with ADLs: No  Pt. Required assistance from staff for: Nicho Maldonado, Cooking, Dressing and Laundry     Staff assists with all dressing and bathing   Pt was feeding himself  Pt. required assist: CGA for transfers and gait and walked with gait belt    W/c for longer distances   Pt walks on toes frequently, unsteady with all gait  History of falls No    Pain   Unknown - pt non-verbal    Cognition    A&O orientation not directly assessed. (pt non-verbal, identity confirmed via pt's bracelet)   Able to follow 1 step commands inconsistently    Subjective  Patient lying supine in bed with no family present. Pt agreeable to this PT eval & tx. Upper Extremity ROM/Strength  Please see OT evaluation. Lower Extremity ROM / Strength   AROM WFL: Yes    Formal strength testing deferred due to poor command following and limited sitting balance. and BLE strength impaired, but not formally assessed with MMT. Lower Extremity Sensation    NT    Lower Extremity Proprioception:   NT    Coordination and Tone  Impaired    Balance  Sitting:  Fair ; Mod A   Comments: 8 minutes at EOB, limited activation of abdominal wall, posterior lean, pt reaching for support with B UEs but has limited strength to correct his posture without assist, poor head control (weak cervical flexion/extension)    Standing: Fair -; Mod A  and 2 persons  Comments: 1 minute with gait belt and HHA bilaterally, pt's eyes fluttering intermittently - pt shows no other distress (suspect BP was low), narrow MAYTE with knees abducted for support, poor head control    Bed Mobility   Supine to Sit:    Max A  and 2 persons  Sit to Supine:   Max A  and 2 persons  Rolling: Max A   Scooting in sitting: Max A  and 2 persons  Scooting in supine:  Max A  and 2 persons    Transfer Training     Sit to stand: Mod A  and 2 persons  Stand to sit:    Max A  and 2 persons  Bed to Chair:   Not Tested with use of N/A    Gait gait completed as indicated below  Distance:      4 steps forward, 2 steps backward  Deviations (firm surface/linoleum):  decreased joe, narrow MAYTE, scissoring, ataxic pattern, decreased step length bilaterally and raises up on toes bilaterally after 2 steps  Assistive Device Used:    gait belt and B HHA  Level of Assist:    Mod A  and 2 persons >Max A 2 persons  Comment: pt with increased unsteadiness after 3 steps, pt required max A to take steps backward including for weight shifting and advancing LE    Stair Training deferred, pt unsafe/ not appropriate to complete stairs at this time    Activity Tolerance   Pt completed therapy session with No adverse symptoms noted w/activity   BP: 106/53 after standing/ambulation (suspect pt was orthostatic with standing)    Positioning Needs   Pt in bed, alarm set, positioned in proper neutral alignment and pressure relief provided. Call light provided and all needs within reach    Other  Total A for donning/doffing shoes    Patient/Family Education   Pt educated on role of inpatient PT, POC, importance of continued activity, transfer techniques. Assessment  Pt seen for Physical Therapy evaluation in acute care setting. Pt demonstrated decreased Activity tolerance, Balance, ROM, Safety and Strength as well as decreased independence with Ambulation, Bed Mobility  and Transfers. Recommending SNF upon discharge as patient functioning well below baseline, demonstrates good rehab potential and unable to return home due to inability to negotiate stairs to enter home/bedroom/bathroom, burden of care beyond caregiver ability, home environment not conducive to patient recovery and limited safety awareness. Goals : To be met in 3 visits:  1). Independent with LE Ex x 10 reps    To be met in 6 visits:  1). Supine to/from sit: Mod A   2). Sit to/from stand: Mod A   3). Bed to chair: Mod A with LRAD  4).   Gait: Ambulate 50 ft.  with Mod A  and use of LRAD (least restrictive assistive device)  5). Tolerate B LE exercises 3 sets of 10-15 reps    Rehabilitation Potential: Good  Strengths for achieving goals include:   PLOF, Family Support and Pt cooperative   Barriers to achieving goals include:    Complexity of condition, Pain and Weakness    Plan    To be seen 3-5 x / week  while in acute care setting for therapeutic exercises, bed mobility, transfers, progressive gait training, balance training, and family/patient education. Signature: Wiley Dixon PT, DPT #[id number]      If patient discharges from this facility prior to next visit, this note will serve as the Discharge Summary.

## 2020-07-15 NOTE — PLAN OF CARE
Problem: Falls - Risk of:  Goal: Will remain free from falls  Description: Will remain free from falls  7/15/2020 0748 by Oli Enrique RN  Outcome: Ongoing  7/15/2020 0032 by Bill Mckeon RN  Outcome: Ongoing  7/14/2020 2010 by Yuko Malloy RN  Outcome: Ongoing  Goal: Absence of physical injury  Description: Absence of physical injury  7/15/2020 0748 by Oli Enrique RN  Outcome: Ongoing  7/15/2020 0032 by Bill Mckeon RN  Outcome: Ongoing  7/14/2020 2010 by Yuko Malloy RN  Outcome: Ongoing     Problem: Skin Integrity:  Goal: Will show no infection signs and symptoms  Description: Will show no infection signs and symptoms  7/15/2020 0748 by Oli Enrique RN  Outcome: Ongoing  7/15/2020 0032 by Bill Mckeon RN  Outcome: Ongoing  7/14/2020 2010 by Yuko Malloy RN  Outcome: Ongoing  Goal: Absence of new skin breakdown  Description: Absence of new skin breakdown  7/15/2020 0748 by Oli Enrique RN  Outcome: Ongoing  7/15/2020 0032 by Bill Mckeon RN  Outcome: Ongoing  7/14/2020 2010 by Yuko Malloy RN  Outcome: Ongoing     Problem: Infection:  Goal: Will remain free from infection  Description: Will remain free from infection  7/15/2020 0748 by Oli Enrique RN  Outcome: Ongoing  7/15/2020 0032 by Bill Mckeon RN  Outcome: Ongoing  7/14/2020 2010 by Yuko Malloy RN  Outcome: Ongoing     Problem: Safety:  Goal: Free from accidental physical injury  Description: Free from accidental physical injury  7/15/2020 0748 by Oli Enrique RN  Outcome: Ongoing  7/15/2020 0032 by Bill Mckeon RN  Outcome: Ongoing  7/14/2020 2010 by Yuko Malloy RN  Outcome: Ongoing  Goal: Free from intentional harm  Description: Free from intentional harm  7/15/2020 0748 by Oli Enrique RN  Outcome: Ongoing  7/15/2020 0032 by Bill Mckeon RN  Outcome: Ongoing  7/14/2020 2010 by Yuok Malloy RN  Outcome: Ongoing     Problem: Daily Care:  Goal: Daily care needs are met  Description: Daily care needs are met  7/15/2020 0748 by Frida Salas RN  Outcome: Ongoing  7/15/2020 0032 by Anastacia Figueroa RN  Outcome: Ongoing  7/14/2020 2010 by Ricardo Nowak RN  Outcome: Ongoing     Problem: Pain:  Goal: Patient's pain/discomfort is manageable  Description: Patient's pain/discomfort is manageable  7/15/2020 0748 by Frida Salas RN  Outcome: Ongoing  7/15/2020 0032 by Anastacia Figueroa RN  Outcome: Ongoing  7/14/2020 2010 by Ricardo Nowak RN  Outcome: Ongoing     Problem: Skin Integrity:  Goal: Skin integrity will stabilize  Description: Skin integrity will stabilize  7/15/2020 0748 by Frida Salas RN  Outcome: Ongoing  7/15/2020 0032 by Anastacia Figueroa RN  Outcome: Ongoing  7/14/2020 2010 by Ricardo Nowak RN  Outcome: Ongoing     Problem: Discharge Planning:  Goal: Patients continuum of care needs are met  Description: Patients continuum of care needs are met  7/15/2020 0748 by Frida Salas RN  Outcome: Ongoing  7/15/2020 0032 by Anastacia Figueroa RN  Outcome: Ongoing  7/14/2020 2010 by Ricardo Nowak RN  Outcome: Ongoing

## 2020-07-15 NOTE — PROGRESS NOTES
placed on 7/13/20  · Goal TF & Flush Orders Provides: Jevity 1.2 with a goal rate of 75 ml/hr x 20 hours = 1500 ml TV, 1800 kcals, 83 g protein, and 1211 ml free water + water flushes of 60 ml every 3 hours or per MD guidance      Anthropometric Measures:  · Height: 5' 8\" (172.7 cm)  · Current Body Weight: 134 lb (60.8 kg)   · Admission Body Weight: 138 lb 14.2 oz (63 kg)    · Usual Body Weight: 170 lb (77.1 kg)(estimated)     · Ideal Body Weight: 154 lbs;  · BMI: 20.4  · BMI Categories: Normal Weight (BMI 18.5-24. 9)       Nutrition Diagnosis:   · Inadequate oral intake related to cognitive or neurological impairment, swallowing difficulty as evidenced by NPO or clear liquid status due to medical condition, weight loss, mild loss of subcutaneous fat, mild muscle loss      Nutrition Interventions:   Food and/or Nutrient Delivery:  Continue Current Tube Feeding  Nutrition Education/Counseling:  No recommendation at this time   Coordination of Nutrition Care:  Continued Inpatient Monitoring, Coordination of Community Care    Goals:  pt will adhere to NPO and tolerate the enteral feeding without N/V?D and weight will trend up towards 150#       Nutrition Monitoring and Evaluation:   Food/Nutrient Intake Outcomes:     Physical Signs/Symptoms Outcomes:  Nausea or Vomiting, Nutrition Focused Physical Findings, Weight, Biochemical Data     Discharge Planning:    Enteral Nutrition     Electronically signed by London Ramos RD, LD on 7/15/20 at 5:13 PM EDT    Contact: [phone]

## 2020-07-15 NOTE — PROGRESS NOTES
Am assessment completed. See flowsheet. Pt voided per urinal with writer holding urinal. Peg tube with 40cc residual. Am meds given via peg tube, tube feed infusing @ 75ml/hr, goal rate. Bed alarm on for safety, call light within reach. Robert Chavez RN\

## 2020-07-15 NOTE — CARE COORDINATION
INTERDISCIPLINARY PLAN OF CARE CONFERENCE    Date/Time: 7/15/2020 2:59 PM  Completed by: Pinky Severance, Case Management      Patient Name:  Lurdes Otoole  YOB: 1995  Admitting Diagnosis: Dysphagia [R13.10]  Dysphagia [R13.10]     Admit Date/Time:  7/11/2020 12:35 PM    Chart reviewed. Interdisciplinary team met to discuss patient progress and discharge plans. Disciplines included Case Management, Nursing, and Dietitian. Current Status: OBS    PT/OT recommendation: SNF    Anticipated Discharge Date: Ready for DC pending LOC  Expected D/C Disposition:  Nursing Home  Confirmed plan with patient's mom  Discharge Plan Comments: Admission at [facility] pending LOC and [address]. Clinicals sent to [facility] for review. Await determination expected this afternoon. +CM will follow and submit LOC to [facility] once [facility] determination is made.      Home O2 in place on admit: No

## 2020-07-15 NOTE — PROGRESS NOTES
Report and transfer of care given to [facility] and ELVIA Jaeger. Pt in bed, awake and quiet, tube feed running at 45mL. No needs apparent at this time.

## 2020-07-15 NOTE — PROGRESS NOTES
PEG site cleaned with NS, dried, changed drsg at 0125. Site CDI, surrounding skin pink. Titrated cont tube feed to 75 ml/hr. Goal rate met. Pt tolerating will. Will continue to monitor.

## 2020-07-15 NOTE — PROGRESS NOTES
Inpatient Occupational Therapy  Evaluation and Treatment    Unit: [facility]  Date:  7/15/2020  Patient Name:    Chris Severino  Admitting diagnosis:  Dysphagia [R13.10]  Dysphagia [R13.10]  Admit Date:  7/11/2020  Precautions/Restrictions/WB Status/ Lines/ Wounds/ Oxygen: Fall risk, Bed/chair alarm and Lines -IV and peg tube helmet   history of autism, significant developmental disability,  cerebral infarct, ataxia    Peg Tube on 7-13-20     Treatment Time:  9244-4773   Treatment Number: 1   Timed code treatment minutes 20 minutes   Total Treatment minutes:   30  minutes    Patient Goals for Therapy:  \" not stated  \"  Pt did not verbalize      Discharge Recommendations: SNF  DME needs for discharge: defer to facility       Therapy recommendations for staff:   Assist of 2 with use of MAXI-Move for all transfers to/from chair    History of Present Illness:   27 y.o. male with a history of autism, significant developmental disability,  cerebral infarct, ataxia, aspiration for evaluation accompanied by  from group home where pt resides, who helps give history patient has baseline autism and is unable to provide history, advised that patient is having swallowing difficulties and needs to have a G-tube placed. Patient had a fall with jaw fracture in September 2019, he has had worsening dysphagia since this incident occurred. His diet includes puréed honey thick liquids via spoon. history of intracranial bleed likely secondary to trauma in 2012  [facility] S4 Level Recommendation:  NA  AM-PAC Score: 9    Preadmission Environment    Pt. Lives  In group home  Home environment:  unknown  Steps to enter first floor: unknown  Steps to second floor: unknown  Bathroom: unknown  Equipment owned: wc (manual )    Preadmission Status:  per pts mother , [place]. Therapist called pts mother for prior status. Pt Fully independent with ADLs: No  Pt.  Required assistance from staff ADLS   Pt. required assist: CGA for transfers and gait and walked with No Device and gait belt . Pts mother stated the pt ambulates on toes and use of gait belt, Pt reportedly used the W/C for longer distances and does not stand very long at one time. History of falls Unknown    Pain  Unknown- no grimacing during eval. Pt did point to abdomen when therapist ask if he had pain        Cognition    A&O alert after sitting up and moving. At times pt appeared somewhat lethargic   Able to follow 1 step commands inconsistently    Subjective  Patient lying supine in bed with no family present. Pt agreeable to this OT eval & tx. Upper Extremity ROM:    Not formally tested however pt was using bilateral UEs to bathe face and hands and tp reach out to therapist when attempting to stand. Upper Extremity Strength:    Not formally tested   Upper Extremity Sensation    NT    Upper Extremity Proprioception:  NT    Coordination and Tone  Impaired    Balance  Functional Sitting Balance:  Impaired  Functional Standing Balance:Impaired    Bed mobility:    Supine to sit:   Mod A  and 2 persons  Sit to supine: Mod A  and 2 persons  Rolling: Mod A  to the left   Scooting in sitting: Mod A   Scooting to head of bed: Mod A  and 2 persons    Bridging:   Not Tested    Transfers:    Sit to stand: Mod A  and 2 persons  Stand to sit:  Mod A , Max A  and 2 persons  Bed to chair:   Not Tested  Standard toilet: Not Tested  Bed to [facility]:  Not Tested  Pt took 3-4 steps with mod to max assist of two hand held assist with gait belt  Pt has decreased head control in standing and leans to the rt. MAx assist to move feet to back up towards the bed. Pt did walk on his toes some during eval.   Dressing:      UE:   Not Tested  LE:    Max A   To don socks and gym shoes   Bathing:    UE:   Min A  with VC to wipe face and hands with demonstration   LE:  Not Tested    Eating:   Not Tested    Toileting:  Not Tested    Activity Tolerance   Pt completed therapy session with fatigue decreased balance     Positioning Needs:   Pt in bed, alarm set, positioned in proper neutral alignment and pressure relief provided. Exercise / Activities Initiated:   N/A    Patient/Family Education:   Role of OT    Assessment of Deficits: Pt seen for Occupational therapy evaluation in acute care setting. Pt demonstrated decreased Activity tolerance, ADLs, Balance , Bed mobility and Transfers. Pt functioning below baseline and will likely benefit from skilled occupational therapy services to maximize safety and independence. Goal(s) : To be met in 3 Visits:  1). Bed to toilet/BSC: Max A  and 2 persons    To be met in 5 Visits:  1). Supine to/from Sit:  Mod A   2). Upper Body Bathing: Mod A   3). Lower Body Bathing:   N/A  4). Upper Body Dressing: Mod A   5). Lower Body Dressing:  N/A  6). Pt to demonstrate UE exs x 15 reps with minimal cues    Rehabilitation Potential:  Fair for goals listed above. Strengths for achieving goals include: Pt cooperative  Barriers to achieving goals include:  Complexity of condition     Plan: To be seen 3-5 x/wk while in acute care setting for therapeutic exercises, bed mobility, transfers, dressing, bathing, family/patient education, ADL/IADL retraining, energy conservation training.      [facility] OTR/L [id number]          If patient discharges from this facility prior to next visit, this note will serve as the Discharge Summary

## 2020-07-15 NOTE — PLAN OF CARE
Problem: Falls - Risk of:  Goal: Will remain free from falls  Description: Will remain free from falls  7/15/2020 0032 by Cristóbal Clifford RN  Outcome: Ongoing  7/14/2020 2010 by Sara Cooper RN  Outcome: Ongoing  Goal: Absence of physical injury  Description: Absence of physical injury  7/15/2020 0032 by Cristóbal Clifford RN  Outcome: Ongoing  7/14/2020 2010 by Sara Cooper RN  Outcome: Ongoing     Problem: Skin Integrity:  Goal: Will show no infection signs and symptoms  Description: Will show no infection signs and symptoms  7/15/2020 0032 by Cristóbal Clifford RN  Outcome: Ongoing  7/14/2020 2010 by Sara Cooper RN  Outcome: Ongoing  Goal: Absence of new skin breakdown  Description: Absence of new skin breakdown  7/15/2020 0032 by Cristóbal Clifford RN  Outcome: Ongoing  7/14/2020 2010 by Sara Cooper RN  Outcome: Ongoing     Problem: Infection:  Goal: Will remain free from infection  Description: Will remain free from infection  7/15/2020 0032 by Cristóbal Clifford RN  Outcome: Ongoing  7/14/2020 2010 by Sara Cooper RN  Outcome: Ongoing     Problem: Safety:  Goal: Free from accidental physical injury  Description: Free from accidental physical injury  7/15/2020 0032 by Cristóbal Clifford RN  Outcome: Ongoing  7/14/2020 2010 by Sara Cooper RN  Outcome: Ongoing  Goal: Free from intentional harm  Description: Free from intentional harm  7/15/2020 0032 by Cristóbal Clifford RN  Outcome: Ongoing  7/14/2020 2010 by Sara Cooper RN  Outcome: Ongoing     Problem: Daily Care:  Goal: Daily care needs are met  Description: Daily care needs are met  7/15/2020 0032 by Cristóbal Clifford RN  Outcome: Ongoing  7/14/2020 2010 by Sara Cooper RN  Outcome: Ongoing     Problem: Pain:  Goal: Patient's pain/discomfort is manageable  Description: Patient's pain/discomfort is manageable  7/15/2020 0032 by Cristóbal Clifford RN  Outcome: Ongoing  7/14/2020 2010 by Barbie Hurtado Arnulfo George RN  Outcome: Ongoing     Problem: Skin Integrity:  Goal: Skin integrity will stabilize  Description: Skin integrity will stabilize  7/15/2020 0032 by Rosi Palomino RN  Outcome: Ongoing  7/14/2020 2010 by Kenny Garza RN  Outcome: Ongoing     Problem: Discharge Planning:  Goal: Patients continuum of care needs are met  Description: Patients continuum of care needs are met  7/15/2020 0032 by Rosi Palomino RN  Outcome: Ongoing  7/14/2020 2010 by Kenny Garza RN  Outcome: Ongoing

## 2020-07-15 NOTE — FLOWSHEET NOTE
07/14/20 2123   Vital Signs   Temp 98.7 °F (37.1 °C)   Temp Source Axillary   Pulse 69   Heart Rate Source Monitor   Resp 16   /62   BP Location Right upper arm   Level of Consciousness 0   MEWS Score 1   Patient Currently in Pain No   Pain Assessment   Pain Assessment Faces   Howard-Baker Pain Rating 0   Oxygen Therapy   SpO2 97 %   O2 Device None (Room air)     Pt alert, nonverbal. PEG tube rate increased to 65 ml/hr at 2135. Will increase to goal rate at 75 ml/hr 4 hrs from that time. Pt tolerating feeding well. Abd binder in place. VSS. Shift assessment complete. See flowsheet. Pm meds given. See MAR. Pt lying in bed in stable condition. No distress noted. Bed alarm on. Bed in low position. Call light within reach. Will continue to monitor.

## 2020-07-15 NOTE — PLAN OF CARE
Nutrition Problem #1: Inadequate oral intake  Intervention: Food and/or Nutrient Delivery: Continue Current Tube Feeding  Nutritional Goals: pt will adhere to NPO and tolerate the enteral feeding without N/V?D and weight will trend up towards 150#

## 2020-07-15 NOTE — CARE COORDINATION
INTERDISCIPLINARY PLAN OF CARE CONFERENCE    Date/Time: 7/15/2020 10:34 AM  Completed by: Jani Cabrera Case Management      Patient Name:  Fang Richardson  YOB: 1995  Admitting Diagnosis: Dysphagia [R13.10]  Dysphagia [R13.10]     Admit Date/Time:  7/11/2020 12:35 PM    Chart reviewed. Interdisciplinary team met to discuss patient progress and discharge plans. Disciplines included Case Management, Nursing, and Dietitian. Current Status: OBS    PT/OT recommendation: Ordered for LOC recommendations    Anticipated Discharge Date: TBD  Expected D/C Disposition:  Nursing Home  Confirmed plan: Yes  Discharge Plan Comments: PASRR pending review at [facility]. Will DC to [facility] once PASRR determination and LOC is completed.      The Plan for Transition of Care is related to the following treatment goals: NH placement    Home O2 in place on admit: No- 96% RA

## 2020-07-16 VITALS
BODY MASS INDEX: 21.05 KG/M2 | OXYGEN SATURATION: 98 % | DIASTOLIC BLOOD PRESSURE: 52 MMHG | WEIGHT: 138.89 LBS | HEIGHT: 68 IN | HEART RATE: 70 BPM | RESPIRATION RATE: 16 BRPM | TEMPERATURE: 96.7 F | SYSTOLIC BLOOD PRESSURE: 98 MMHG

## 2020-07-16 LAB
GLUCOSE BLD-MCNC: 106 MG/DL (ref 70–99)
GLUCOSE BLD-MCNC: 107 MG/DL (ref 70–99)
GLUCOSE BLD-MCNC: 110 MG/DL (ref 70–99)
GLUCOSE BLD-MCNC: 91 MG/DL (ref 70–99)
PERFORMED ON: ABNORMAL
PERFORMED ON: NORMAL

## 2020-07-16 PROCEDURE — G0378 HOSPITAL OBSERVATION PER HR: HCPCS

## 2020-07-16 PROCEDURE — 6370000000 HC RX 637 (ALT 250 FOR IP): Performed by: NURSE PRACTITIONER

## 2020-07-16 PROCEDURE — 2580000003 HC RX 258: Performed by: INTERNAL MEDICINE

## 2020-07-16 PROCEDURE — 99217 PR OBSERVATION CARE DISCHARGE MANAGEMENT: CPT | Performed by: INTERNAL MEDICINE

## 2020-07-16 RX ADMIN — RISPERIDONE 1.5 MG: 1 TABLET ORAL at 09:06

## 2020-07-16 RX ADMIN — SODIUM CHLORIDE: 9 INJECTION, SOLUTION INTRAVENOUS at 09:06

## 2020-07-16 ASSESSMENT — PAIN SCALES - WONG BAKER: WONGBAKER_NUMERICALRESPONSE: 0

## 2020-07-16 NOTE — PROGRESS NOTES
Am assessment completed. See flowsheet. Pt singing and laughing this am. Writer provided mouth care, am meds given via peg tube, 0 residual with peg tube noted. Bed alarm on for safety, call light within reach. Baldemar Ryan RN\

## 2020-07-16 NOTE — PROGRESS NOTES
LIPASE, BILIDIR, BILITOT, ALKPHOS in the last 72 hours. Invalid input(s): AMYLASE,  ALB  No orders to display     CT cervical spine 4/23/2020     Normal CT cervical spine. Assessment:  Active Problems:    Dysphagia    H/O intracranial hemorrhage    Mild protein-calorie malnutrition (HCC)  Resolved Problems:    * No resolved hospital problems. *      Plan:    1. Dysphagia. Patient with autism and history of intracerebral bleed is admitted for G-tube placement. GI consulted. PEG tube placement. Started TF. Tolerating. Continue patient's current group home medications. Lovenox for DVT prophylaxis    DCP is working on placement.     [facility], MD 7/16/2020 12:13 PM

## 2020-07-16 NOTE — FLOWSHEET NOTE
Pt alert, non-verbal. FAREED orientation. Jevity changed at 2030. Helmet in place & side rails padded for safety. Shift assessment complete. See flowsheet. Pm meds given. See MAR. Pt lying in bed in stable condition, TV on for entertainment. Pt appears comfortable. No distress noted. Bed in low position. Call light within reach. Will continue to monitor.         07/15/20 2011   Vital Signs   Temp 99 °F (37.2 °C)   Temp Source Oral   Pulse 70   Heart Rate Source Monitor   Resp 16   BP (!) 92/53   BP Location Right upper arm   Patient Position High fowlers   Level of Consciousness 0   MEWS Score 2   Patient Currently in Pain No   Pain Assessment   Pain Assessment Faces   Howard-Baker Pain Rating 0   Oxygen Therapy   SpO2 97 %   O2 Device None (Room air)

## 2020-07-16 NOTE — CARE COORDINATION
DISCHARGE ORDER  Date/Time 2020 4:14 PM  Completed by: Rosita Elder Case Management    Patient Name: Dean Aguirre    : 1995    Admitting Diagnosis: Dysphagia [R13.10]  Dysphagia [R13.10]    Financial Payer Type : Medicare/Medicaid  Admit order Date and Status: OBS  (verify MD's last order for status of admission/Traditional Medicare 3 day qualifying stay required for SNF)    Noted discharge order. Confirmed discharge plan: SNF placement @ [facility] D/W mother/LG. +bed. PASRR completed and LOC set to [facility] for review. [facility] admission will follow up on LOC at the facility per Brissaradha Fernando RN    Pt is being d/c'd to to [facility] today. Pt's O2 sats are 98% on RA. Discharge orders and Continuity of Care faxed to facility: Yes  Hospital Exemption Notification System complete: MILAD[facility] Mclean determination completed and LOC sent to [facility] to be followed up on by facility. Transportation arranged: Yes - [address] @ [address]. Patient / Family (pt's mother notified by Steven Butt) aware of  time: Yes   Nursing aware of  time: Yes- Allyson RN  Receiving facility aware of  time: Yes: Brissa Fernando (Admissions)  Pre-cert obtained? NA    Discharge timeout done with Allyson GAN All discharge needs and concerns addressed. Discharging nurse to complete SARBJIT, reconcile AVS, and place final copy with patient's discharge packet. Discharging RN to ensure that written prescriptions for  Level II medications are sent with patient to the facility as per protocol.

## 2020-07-16 NOTE — PLAN OF CARE
Problem: Falls - Risk of:  Goal: Will remain free from falls  Description: Will remain free from falls  2020 075 by Royden Apgar, RN  Outcome: Ongoing  2020 033 by Max Arguelles RN  Outcome: Ongoing  Goal: Absence of physical injury  Description: Absence of physical injury  2020 075 by Royden Apgar, RN  Outcome: Ongoing  2020 0339 by Max Arguelles RN  Outcome: Ongoing     Problem: Skin Integrity:  Goal: Will show no infection signs and symptoms  Description: Will show no infection signs and symptoms  2020 075 by Royden Apgar, RN  Outcome: Ongoing  2020 0339 by Max Arguelles RN  Outcome: Ongoing  Goal: Absence of new skin breakdown  Description: Absence of new skin breakdown  2020 075 by Royden Apgar, RN  Outcome: Ongoing  2020 0339 by Max Arguelles RN  Outcome: Ongoing     Problem: Infection:  Goal: Will remain free from infection  Description: Will remain free from infection  2020 075 by Royden Apgar, RN  Outcome: Ongoing  2020 0339 by Max Arguelles RN  Outcome: Ongoing     Problem: Safety:  Goal: Free from accidental physical injury  Description: Free from accidental physical injury  2020 075 by Royden Apgar, RN  Outcome: Ongoing  2020 0339 by Max Arguelles RN  Outcome: Ongoing  Goal: Free from intentional harm  Description: Free from intentional harm  2020 075 by Royden Apgar, RN  Outcome: Ongoing  2020 0339 by Max Arguelles RN  Outcome: Ongoing     Problem: Daily Care:  Goal: Daily care needs are met  Description: Daily care needs are met  2020 075 by Royden Apgar, RN  Outcome: Ongoing  2020 0339 by Max Arguelles RN  Outcome: Ongoing     Problem: Pain:  Goal: Patient's pain/discomfort is manageable  Description: Patient's pain/discomfort is manageable  2020 075 by Royden Apgar, RN  Outcome: Ongoing  2020 033 by Max Arguelles RN  Outcome: Ongoing Problem: Skin Integrity:  Goal: Skin integrity will stabilize  Description: Skin integrity will stabilize  7/16/2020 0757 by Greta Smith RN  Outcome: Ongoing  7/16/2020 0339 by Lori Gong RN  Outcome: Ongoing     Problem: Discharge Planning:  Goal: Patients continuum of care needs are met  Description: Patients continuum of care needs are met  7/16/2020 0757 by Greta Smith RN  Outcome: Ongoing  7/16/2020 0339 by Lori Gong RN  Outcome: Ongoing

## 2020-08-09 ENCOUNTER — HOSPITAL ENCOUNTER (EMERGENCY)
Age: 25
Discharge: NURSING, REHAB OR LONG-TERM CARE | End: 2020-08-09
Attending: EMERGENCY MEDICINE
Payer: MEDICARE

## 2020-08-09 ENCOUNTER — APPOINTMENT (OUTPATIENT)
Dept: CT IMAGING | Age: 25
End: 2020-08-09
Payer: MEDICARE

## 2020-08-09 VITALS
WEIGHT: 138 LBS | HEART RATE: 63 BPM | SYSTOLIC BLOOD PRESSURE: 112 MMHG | RESPIRATION RATE: 18 BRPM | TEMPERATURE: 98 F | OXYGEN SATURATION: 97 % | HEIGHT: 68 IN | BODY MASS INDEX: 20.92 KG/M2 | DIASTOLIC BLOOD PRESSURE: 65 MMHG

## 2020-08-09 PROCEDURE — 70450 CT HEAD/BRAIN W/O DYE: CPT

## 2020-08-09 PROCEDURE — 99283 EMERGENCY DEPT VISIT LOW MDM: CPT

## 2020-08-09 NOTE — ED NOTES
Patient indicates that he is comfortable by giving this nurse \"thumbs up\"  Will continue to monitor patient.      Gely Freedman RN  08/09/20 0833

## 2020-08-09 NOTE — ED NOTES
Patient discharged to [facility] via ambulance. Written discharge instructions given to transport. Copy of AVS and belongings sent with patient. Patient transferred from ED by stretcher.      Mina Calderon RN  08/09/20 9602

## 2020-08-09 NOTE — ED TRIAGE NOTES
Pt to ED from [facility] for a head injury. Per EMS pt took off his helmet and slammed his head on the floor three or four times. Pt is autistic. Pt has no open lacerations upon exam. Pt does have a lump on forehead.  Pt is non verbal.

## 2020-09-16 ENCOUNTER — HOSPITAL ENCOUNTER (OUTPATIENT)
Dept: GENERAL RADIOLOGY | Age: 25
Discharge: HOME | End: 2020-09-16
Payer: MEDICARE

## 2020-09-16 ENCOUNTER — HOSPITAL ENCOUNTER (OUTPATIENT)
Dept: SPEECH THERAPY | Age: 25
Setting detail: THERAPIES SERIES
Discharge: HOME | End: 2020-09-16
Payer: MEDICARE

## 2020-09-16 PROCEDURE — 92611 MOTION FLUOROSCOPY/SWALLOW: CPT

## 2020-09-16 PROCEDURE — 74230 X-RAY XM SWLNG FUNCJ C+: CPT

## 2020-09-16 NOTE — PROCEDURES
INSTRUMENTAL SWALLOW REPORT  OUTPATIENT MODIFIED BARIUM SWALLOW    NAME: Jacky Calle   : 1995  MRN: [id number]       Date of Eval: 2020     Ordering Physician: Greta Zuleta  Referring Diagnosis: dysphagia    Past Medical History:  has a past medical history of Ataxia, Autism, Cerebral artery occlusion with cerebral infarction ([facility] [address] and Encephalopathy. Past Surgical History:  has a past surgical history that includes Upper gastrointestinal endoscopy (N/A, 2020). Current Diet Solid Consistency: NPO  Current Diet Liquid Consistency: NPO    Date of Prior Study: 20  Type of Study: Repeat MBS  Results of Prior Study:   The pt demonstrates severe oropharyngeal dysphagia. The oral phase is characterized by reduced A-P bolus propulsion along with lingual pumping to facilitate bolus transfer to the pharynx. Nasal regurgitation occurred after the swallow. This appeared to be more due reduced pharyngeal clearing with part of the bolus coming back up from the pharynx into the nasal cavity post-initial swallow. Reduced velopharyngeal closure is another explanation. However, the velum appeared to elevate during the swallow. It was after the initial swallow that the bolus eventually came back up into this nasopharynx. The pharyngeal phase is marked by reduced epiglottic distension with the epiglottis and posterior pharyngeal wall making contact during the swallow. There was apparent reduced anterior laryngeal movement. There was also apparent anterior curvature of the cervical spine. The therapist attempted to change the pt's position in his chair but this was not successful at reducing this contact between the epiglottis and posterior pharyngeal wall. As a result there was significant residue in valleculae that later spilled to the lower pharynx. There was frequent laryngeal penetration with all consistencies given (puree and honey-thick liquid via spoon were the only consistencies given).  There was eventual aspiration of honey-thick liquid. Puree residue from previous trials also appeared to be apart of this aspiration. The pt's cough reflex was inconsistent. He often had no cough reflex with deep penetration. He did have a delayed cough following aspiration but this cough was not successful at preventing the aspiration. Tongue base retraction, anterior laryngeal movement,  posterior pharyngeal wall movement and thyrohyoid approximation all were partially reduced. Strategies such as a head rotation were not successful. The pt was unable to complete a chin tuck despite cues. At this time the pt is at high risk for aspiration. Recommend NPO status with alternative means of nutrition. Patient Complaints/Reason for Referral:  Chris Severino was referred for a MBS to assess the efficiency of his swallow function, assess for aspiration, and to make recommendations regarding safe dietary consistencies, effective compensatory strategies, and safe eating environment. Patient complaints: unable to state (nonverbal); responded in positive manner and made vocalization when asked if hungry    Onset of problem:   Date of Onset: h/o dysphagia since 2012; worsening dysphagia s/p fall with jaw fx in November 2019 with recent exacerbation July 2020  General Comment  Comments: Pt presents from group home for repeat MBS. PMHx significant for autism, ICH s/p head trauma in 2012, fall with jaw (November 2019), oropharyngeal dysphagia, and PEG tube placement (July 2020). Pt currently NPO + TF s/p most recent MBS in July 2020. Subjective  Subjective: Pt with helmet donned and attended procedure with caregiver. Pt reported to give thumbs up / down to respond and follow single step directions but inconsistent. Pt did not follow directions for oral mech exam.    Behavior/Cognition/Vision/Hearing:  Behavior/Cognition: Alert; Cooperative; Requires cueing; Impulsive  Vision: Within Functional Limits(for tasks presented)  Hearing: Within functional limits(for tasks presented)    Impressions:  Oral Phase: Pt with open mouth posturing persistent throughout, reduced lingual and labial coordination noted when accepting boluses. Mastication of regular solid prolonged and disorganized with reduced recollection and piecemeal swallowing. Slowed posterior transit with lingual pumping noted with all. Mild-mod oral residue exhibited with all consistencies, resulting in intermittent piecemeal swallowing of residue. Eventual mild anterior spillage on L side exhibited on buildup of residue + secretions. Reduced velopharyngeal closure resulted in mild nasal reflux with persistent residue between velum and posterior pharyngeal wall. Reduced base of tongue retraction with all. Pharyngeal: Pharyngeal phase marked by reduced base of tongue contact with posterior pharyngeal wall, limited pharyngeal stripping wave, reduced epiglottic inversion, and reduced laryngeal elevation. This resulted in trace penetration of thin liquids, nectar thick liquid, and regular solid + pharyngeal residue during the swallow that was not fully clearing (trace residue remained on underside of epiglottis). Deep penetration with eventual aspiration without cough reflex occurred with sequential sips of thins via straw. Pt may have been sensate to aspiration as eventually pointed to throat and this was only instance of this occurring - difficult to determing meaning given pt's nonverbal status and inconsistent ability to follow directions. Pt unable to complete cough on command despite cues. No aspiration observed with any other trials, including sequential large sips of thins via cup edge. Persistent residue exhibited on base of tongue, in valleculae, posterior pharyngeal wall, pyriforms, and UES. Pt unable to follow directions for volitional swallow vs unable to elicit but spontaneously completed second swallow at times that assisted with clearing residue.  Thin liquid rinse assisted in clearing residue from solids. Pt will be at increased aspiration risk with large amounts of liquids at a time and with use of straw. Upper Esophageal Screen: Reduced duration and distension of UES opening. Dysphagia Outcome Severity Scale: Level 3: Moderate dysphagia- Total assisstance, supervision or strategies. Two or more diet consistencies restricted  Penetration-Aspiration Scale (PAS): 8 - Material Enters the airway, passes below the vocal folds, and no effort is made to eject    Treatment Dx and ICD 10: oropharyngeal dysphagia R13.12   Patient Position: Lateral and Patient Degrees: 90  Consistencies Administered: Reg solid; Nectar  teaspoon;Nectar cup;Honey teaspoon;Honey cup; Thin cup; Thin teaspoon; Thin straw;Dysphagia Pureed (Dysphagia I)  Compensatory Swallowing Strategies Attempted: Upright as possible for all oral intake; Alternate solids and liquids  Postural Changes and/or Swallow Maneuvers Trialed: Upright    Recommended Diet:  Solid consistency: Dysphagia Soft and Bite-Sized (Dysphagia III)  Liquid consistency: Thin  Liquid administration via: Cup;Spoon  Medication administration: Meds in puree    Safe Swallow Protocol:  Supervision: 1:1  Compensatory Swallowing Strategies: Alternate solids and liquids;Eat/Feed slowly; No straws;Upright as possible for all oral intake;Remain upright for 30-45 minutes after meals;Small bites/sips; External pacing      Recommendations/Treatment  Requires SLP Intervention: Yes(recommend SLP f/u for meal time evaluation to assess solids)  D/C Recommendations: 24 hour supervision/assistance  Postural Changes and/or Swallow Maneuvers: Upright    Education:  Patient Education: Educated pt and caregiver to purpose of MBS procedure, results of session, risks of aspiration, diet recs, and compensatory swallow strategies as well as rec for f/u with SLP for further assessment of solids at meal time.   Patient Education Response: No evidence of learning    Prognosis  Prognosis for safe diet advancement: fair  Barriers to reach goals: cognitive deficits;time post onset;behavior  Barriers/Prognosis Comment: impulsivity  Safety Devices  Safety Devices in place: (left with transport and caregiver at side)      Oral Preparation / Oral Phase  Oral Phase: Impaired  Oral Phase - Major Contributing Deficits  Left Anterior Bolus Loss: (eventual loss of secretions mixed with mild oral residue)  Poor Mastication: Reg solid  Weak Lingual Manipulation: Reg solid;Puree  Lingual Pumping: All  Reduced Posterior Propulsion: All  Reduced Bolus Control: All  Decreased Bolus Cohesion: Reg solid  Lingual / Palatal Residue: All  Piecemeal Swallowing: Reg solid(intermittent with all)  Decreased Velopharyngeal Closure: All  Delayed Trigger of Palatal Elevation: All  Nasal Reflux: All  Reduced Tongue Base Retraction: All        Pharyngeal Phase  Pharyngeal Phase: Impaired  Pharyngeal Phase - Major Contributing Deficits  Reduced Pharyngeal Peristalsis: All  Reduced Epiglottic Distention: All(absent at times)  Reduced Laryngeal Elevation: All  Reduced Thyrohyoid Approximation: All  Reduced Airway/laryngeal Closure: All  Reduced Tongue Base: All  Shallow Penetration During: Thin cup; Thin straw;Nectar cup;Reg solid  Parital Self-clearing (shallow): Reg solid; Nectar cup; Thin cup; Thin straw  Deep Penetration During: Thin straw  No Retrieval (deep): Thin straw  Aspiration During: Thin straw  Trace Aspiration: Thin straw  No Cough Reflex: Thin straw  No Bolus Expelled: Thin straw  Pharyngeal Residue - Valleculae: All  Pharyngeal Residue - Pyriform: All  Pharyngeal Residue - Posterior Pharynx: All  Pharyngeal Residue - UES: All  Pharyngeal Wall - Weakness: All      Esophageal Phase  Esophageal Screen: Impaired  Upper Esophageal Screen- Major Contributing Deficits  Reduced Cricopharyngeal Opening:  All        Pain   Patient Currently in Pain: No         Therapy Time:   Individual Concurrent Group Co-treatment   Time In 1340 0000 0000 0000   Time Out 1400 0000 0000 0000   Minutes 20 0 0 0   Variance: 0  Timed Code Treatment Minutes: 0 Minutes  Total Treatment Time: 20      Stella Segundo MA CCC-SLP; [id number]  Speech-Language Pathologist  Office # [phone]

## 2020-09-30 ENCOUNTER — APPOINTMENT (OUTPATIENT)
Dept: CT IMAGING | Age: 25
End: 2020-09-30
Payer: MEDICARE

## 2020-09-30 ENCOUNTER — HOSPITAL ENCOUNTER (EMERGENCY)
Age: 25
Discharge: NURSING, REHAB OR LONG-TERM CARE | End: 2020-10-01
Attending: EMERGENCY MEDICINE
Payer: MEDICARE

## 2020-09-30 PROCEDURE — 12001 RPR S/N/AX/GEN/TRNK 2.5CM/<: CPT

## 2020-09-30 PROCEDURE — 99283 EMERGENCY DEPT VISIT LOW MDM: CPT

## 2020-09-30 PROCEDURE — 6370000000 HC RX 637 (ALT 250 FOR IP): Performed by: EMERGENCY MEDICINE

## 2020-09-30 PROCEDURE — 70450 CT HEAD/BRAIN W/O DYE: CPT

## 2020-09-30 RX ADMIN — Medication 3 ML: at 21:21

## 2020-10-01 VITALS
RESPIRATION RATE: 18 BRPM | TEMPERATURE: 97.7 F | OXYGEN SATURATION: 99 % | SYSTOLIC BLOOD PRESSURE: 105 MMHG | HEART RATE: 84 BPM | WEIGHT: 144.3 LBS | BODY MASS INDEX: 22.65 KG/M2 | DIASTOLIC BLOOD PRESSURE: 70 MMHG | HEIGHT: 67 IN

## 2020-10-01 NOTE — ED NOTES
Report given by previous RN/discharge summary sent with patient/first care here to transport patient to [facility]  10/01/20 5983

## 2020-10-01 NOTE — ED NOTES
Bed: A05-05  Expected date:   Expected time:   Means of arrival:   Comments:  HARRISON Love, RN  09/30/20 2026

## 2020-10-01 NOTE — ED PROVIDER NOTES
Lac Repair    Date/Time: 10/1/2020 12:27 AM  Performed by: Toyin Rousseau MD  Authorized by: Mili Wade MD     Consent:     Consent obtained:  Verbal    Consent given by:  Patient    Risks discussed:  Infection and pain    Alternatives discussed:  No treatment  Anesthesia (see MAR for exact dosages): Anesthesia method:  Topical application    Topical anesthetic:  LET  Repair type:     Repair type:  Simple  Treatment:     Area cleansed with:  Saline    Irrigation solution:  Sterile saline  Skin repair:     Repair method:  Staples    Number of staples:  2  Approximation:     Approximation:  Close  Post-procedure details:     Dressing:  Open (no dressing)    Patient tolerance of procedure:   Tolerated well, no immediate complications           Toyin Rousseau MD  Resident  10/01/20 5965

## 2020-10-01 NOTE — ED NOTES
.Patient prepared for discharge and return to extended care facility. Discharge instructions given to transport crew after report called to [facility]. All verbalized understanding of discharge instructions and return to extended care facility.       Art Villareal RN  10/01/20 0452

## 2020-10-01 NOTE — ED PROVIDER NOTES
[address]          ATTENDING PHYSICIAN NOTE       Date of evaluation: 9/30/2020    Chief Complaint     Laceration (LACERATION TO HEAD/AFTER BANGING HEAD AGAINST THE WALL/PT IS AT [facility] FOR AUSTISM SCREENING AND DYSPHAGIA/)      History of Present Illness     Alexis Pedraza is a 27 y.o. male who presents with head laceration. The patient has a history of severe autism as well as self-injurious behavior. He has a history of self-induced head trauma where he removes his helmet and bangs his head against something causing injury. This most recently happened August 9, 2020. Happened again today where the patient banged his head against an object sustaining a laceration to the left temple. The patient is nonverbal at baseline and there is reportedly no change in his behavior. I am unable to obtain further history due to the patient being nonverbal    Review of Systems     I am unable to obtain review of systems due to the patient being nonverbal    Past Medical, Surgical, Family, and Social History     He has a past medical history of Ataxia, Autism, Cerebral artery occlusion with cerebral infarction ([facility] [address] and Encephalopathy. He has a past surgical history that includes Upper gastrointestinal endoscopy (N/A, 7/13/2020). His family history is not on file. He reports that he has never smoked. He has never used smokeless tobacco. He reports that he does not drink alcohol or use drugs. Medications     Previous Medications    ACETAMINOPHEN (TYLENOL) 325 MG TABLET    Take 650 mg by mouth every 6 hours as needed for Pain    ALBUTEROL SULFATE HFA (PROAIR HFA) 108 (90 BASE) MCG/ACT INHALER    Use 2 puffs every 4 hours while awake for  PRN cough/wheezing  Dispense with SPACER and Instruct on use. May sub Ventolin or Proventil as needed per [employer].     ALUMINUM & MAGNESIUM HYDROXIDE-SIMETHICONE (MAALOX) 200-200-20 MG/5ML SUSP SUSPENSION    Take 5 mLs by mouth every 6 hours as needed for Indigestion    BENZOCAINE-MENTHOL (CHLORASEPTIC) 6-10 MG LOZG LOZENGE    Take 1 lozenge by mouth every 2 hours as needed for Sore Throat    CAMPHOR-EUCALYPTUS-MENTHOL (VICKS VAPORUB EX)    Apply topically 3 times daily as needed    DEXTROMETHORPHAN-GUAIFENESIN  MG/5ML SYRP    Take 5 mLs by mouth every 4 hours as needed for Cough    FLUTICASONE (FLONASE) 50 MCG/ACT NASAL SPRAY    1 spray by Each Nare route daily    HEPATITIS B VACCINE (ENGERIX-B) 20 MCG/ML INJECTION    Inject 20 mcg into the muscle once    LOPERAMIDE (ANTI-DIARRHEAL) 2 MG TABLET    Take 2 mg by mouth 4 times daily as needed for Diarrhea    MAGNESIUM HYDROXIDE (MILK OF MAGNESIA CONCENTRATE) 2400 MG/10ML SUSP    Take 2,400 mg by mouth once as needed    NUTRITIONAL SUPPLEMENTS (ENSURE ACTIVE PO)    Take by mouth    PHENOL 1.4 % LIQD MOUTH SPRAY    Take 1 drop by mouth once    PSEUDOEPHEDRINE (SUDAFED 12 HR) 120 MG EXTENDED RELEASE TABLET    Take 120 mg by mouth every 12 hours    RISPERIDONE (RISPERDAL) 1 MG TABLET    Take 1.5 mg by mouth daily     TOLNAFTATE (TINACTIN) 1 % SPRAY    Apply topically 2 times daily Apply topically 2 times daily. TRAZODONE (DESYREL) 100 MG TABLET    Take 100 mg by mouth nightly        Allergies     He is allergic to ziprasidone hcl. Physical Exam     INITIAL VITALS: BP: 103/67, Temp: 97.7 °F (36.5 °C), Pulse: 88, Resp: 16, SpO2: 100 %       General:  Well appearing. No acute distress. Awake and alert    Eyes:  Pupils reactive. No discharge from eyes. ENT:  No discharge from nose. Neck:  Supple. Pulmonary:   Non-labored breathing. Breath sounds clear bilaterally. Cardiac:  Regular rate and rhythm. No murmurs. Abdomen:  Soft. Non-tender. Non-distended. Skin: 1 cm laceration to left temple. Neuro: Awake and alert, easily regards and interacts but is nonverbal at baseline. Moves all four extremities symmetrically. Extremities:  Warm and perfused.  No peripheral

## (undated) DEVICE — ENDO CARRY-ON PROCEDURE KIT INCLUDES SUCTION TUBING, LUBRICANT, GAUZE, BIOHAZARD STICKER, TRANSPORT PAD AND INTERCEPT BEDSIDE KIT.: Brand: ENDO CARRY-ON PROCEDURE KIT

## (undated) DEVICE — Device

## (undated) DEVICE — BINDER ABDOMEN ENDO

## (undated) DEVICE — CONMED SCOPE SAVER BITE BLOCK, 20X27 MM: Brand: SCOPE SAVER

## (undated) DEVICE — RESTRAINT LIMB QUIK RELEASE CLOSURE WRST DETACHABLE WSH LF